# Patient Record
Sex: MALE | Race: BLACK OR AFRICAN AMERICAN | Employment: FULL TIME | URBAN - METROPOLITAN AREA
[De-identification: names, ages, dates, MRNs, and addresses within clinical notes are randomized per-mention and may not be internally consistent; named-entity substitution may affect disease eponyms.]

---

## 2017-11-30 ENCOUNTER — OFFICE VISIT (OUTPATIENT)
Dept: FAMILY MEDICINE CLINIC | Age: 57
End: 2017-11-30

## 2017-11-30 VITALS
BODY MASS INDEX: 36.91 KG/M2 | WEIGHT: 287.6 LBS | OXYGEN SATURATION: 98 % | HEIGHT: 74 IN | TEMPERATURE: 98.7 F | HEART RATE: 76 BPM | DIASTOLIC BLOOD PRESSURE: 76 MMHG | RESPIRATION RATE: 18 BRPM | SYSTOLIC BLOOD PRESSURE: 128 MMHG

## 2017-11-30 DIAGNOSIS — Z86.69 HISTORY OF GLAUCOMA: ICD-10-CM

## 2017-11-30 DIAGNOSIS — Z13.220 LIPID SCREENING: ICD-10-CM

## 2017-11-30 DIAGNOSIS — Z13.1 SCREENING FOR DIABETES MELLITUS: ICD-10-CM

## 2017-11-30 DIAGNOSIS — M17.12 ARTHRITIS OF LEFT KNEE: Primary | ICD-10-CM

## 2017-11-30 DIAGNOSIS — D57.3 SICKLE-CELL TRAIT (HCC): Chronic | ICD-10-CM

## 2017-11-30 LAB
A/G RATIO: 1.9 (ref 1.1–2.2)
ALBUMIN SERPL-MCNC: 4.8 G/DL (ref 3.4–5)
ALP BLD-CCNC: 59 U/L (ref 40–129)
ALT SERPL-CCNC: 17 U/L (ref 10–40)
ANION GAP SERPL CALCULATED.3IONS-SCNC: 14 MMOL/L (ref 3–16)
AST SERPL-CCNC: 20 U/L (ref 15–37)
BASOPHILS ABSOLUTE: 0.1 K/UL (ref 0–0.2)
BASOPHILS RELATIVE PERCENT: 0.9 %
BILIRUB SERPL-MCNC: 0.3 MG/DL (ref 0–1)
BUN BLDV-MCNC: 22 MG/DL (ref 7–20)
CALCIUM SERPL-MCNC: 9.5 MG/DL (ref 8.3–10.6)
CHLORIDE BLD-SCNC: 101 MMOL/L (ref 99–110)
CHOLESTEROL, TOTAL: 186 MG/DL (ref 0–199)
CO2: 25 MMOL/L (ref 21–32)
CREAT SERPL-MCNC: 1.4 MG/DL (ref 0.9–1.3)
EOSINOPHILS ABSOLUTE: 0.2 K/UL (ref 0–0.6)
EOSINOPHILS RELATIVE PERCENT: 2.7 %
GFR AFRICAN AMERICAN: >60
GFR NON-AFRICAN AMERICAN: 52
GLOBULIN: 2.5 G/DL
GLUCOSE BLD-MCNC: 118 MG/DL (ref 70–99)
HCT VFR BLD CALC: 40.6 % (ref 40.5–52.5)
HDLC SERPL-MCNC: 32 MG/DL (ref 40–60)
HEMOGLOBIN: 12.8 G/DL (ref 13.5–17.5)
LDL CHOLESTEROL CALCULATED: 121 MG/DL
LYMPHOCYTES ABSOLUTE: 1.8 K/UL (ref 1–5.1)
LYMPHOCYTES RELATIVE PERCENT: 31.2 %
MCH RBC QN AUTO: 22.3 PG (ref 26–34)
MCHC RBC AUTO-ENTMCNC: 31.4 G/DL (ref 31–36)
MCV RBC AUTO: 70.9 FL (ref 80–100)
MONOCYTES ABSOLUTE: 0.4 K/UL (ref 0–1.3)
MONOCYTES RELATIVE PERCENT: 7.6 %
NEUTROPHILS ABSOLUTE: 3.4 K/UL (ref 1.7–7.7)
NEUTROPHILS RELATIVE PERCENT: 57.6 %
PDW BLD-RTO: 15.2 % (ref 12.4–15.4)
PLATELET # BLD: 235 K/UL (ref 135–450)
PMV BLD AUTO: 8.8 FL (ref 5–10.5)
POTASSIUM SERPL-SCNC: 5 MMOL/L (ref 3.5–5.1)
RBC # BLD: 5.73 M/UL (ref 4.2–5.9)
SODIUM BLD-SCNC: 140 MMOL/L (ref 136–145)
TOTAL PROTEIN: 7.3 G/DL (ref 6.4–8.2)
TRIGL SERPL-MCNC: 163 MG/DL (ref 0–150)
VLDLC SERPL CALC-MCNC: 33 MG/DL
WBC # BLD: 5.9 K/UL (ref 4–11)

## 2017-11-30 PROCEDURE — 36415 COLL VENOUS BLD VENIPUNCTURE: CPT | Performed by: FAMILY MEDICINE

## 2017-11-30 PROCEDURE — 99203 OFFICE O/P NEW LOW 30 MIN: CPT | Performed by: FAMILY MEDICINE

## 2017-11-30 RX ORDER — IBUPROFEN 800 MG/1
800 TABLET ORAL EVERY 8 HOURS PRN
Qty: 60 TABLET | Refills: 2 | Status: SHIPPED | OUTPATIENT
Start: 2017-11-30 | End: 2019-08-09 | Stop reason: SDUPTHER

## 2017-11-30 ASSESSMENT — ENCOUNTER SYMPTOMS
BACK PAIN: 1
NAUSEA: 0
SHORTNESS OF BREATH: 0
DIARRHEA: 0
CONSTIPATION: 0
ABDOMINAL PAIN: 0
VOMITING: 0

## 2017-11-30 ASSESSMENT — PATIENT HEALTH QUESTIONNAIRE - PHQ9
2. FEELING DOWN, DEPRESSED OR HOPELESS: 0
SUM OF ALL RESPONSES TO PHQ9 QUESTIONS 1 & 2: 0
SUM OF ALL RESPONSES TO PHQ QUESTIONS 1-9: 0
1. LITTLE INTEREST OR PLEASURE IN DOING THINGS: 0

## 2017-11-30 NOTE — PATIENT INSTRUCTIONS
Patient Education        Deciding About Knee Replacement Surgery  What is knee replacement surgery? Knee replacement surgery replaces the worn ends of the thighbone (femur) and the lower leg bone (tibia) where they meet at the knee. Your doctor will take out the damaged bone and replace it with plastic and metal parts. These new parts may be attached to your bones with cement. You will need a lot of rehabilitation, or rehab, after surgery. This takes several weeks. But you should be able to start to walk, climb stairs, sit in and get up from chairs, and do other daily movements within a few days. What are macias points about this decision? · The decision you and your doctor make depends on how much pain and disability you have. It also depends on your age, health, and activity level. · Most people have this surgery only when they can no longer control knee pain with other treatments and when the pain disrupts their lives. · Rehab after this surgery means doing daily exercises for several weeks. · Most knee replacements last for at least 15 years. You may need to have the knee replaced again. Why might you choose to replace your knee? · You are not able to do most of your activities without pain. · You have very bad arthritis pain. Other treatments have not helped. · You do not have other health problems that would make surgery too risky. · You are not worried that you may need to have another knee replacement later in life. · You aren't worried about side effects. These may include infections, blood clots, and loss of range of motion. · You are willing to do weeks of rehab. · You want to have the surgery before you lose too much of your ability to be active. If you are not able to stay active, strong, and flexible before the surgery, it can be harder to return to your normal activities after the surgery. Why might you choose not to replace your knee?   · You can still do most of your

## 2017-11-30 NOTE — PROGRESS NOTES
[facility] Medicine  Clinic Note    Date: 11/30/2017                                               Subjective:     Chief Complaint   Patient presents with    New Patient     ESTABLISH CARE     Knee Pain     LEFT KNEE PAIN  SEVERAL YEARS HAS BEEN GOING ON . TAKES IBUPROFEN OCCASIONALLY THAT SOMETIMES HELPS. WENT TO AN ORTHOPEDIC DOCTOR SEVERAL YEARS AGO AND HAD CORTIZONE SHOTS THAT RELIEVED PAIN FOR AWHILE. HPI  Knee Pain: Patient complains of left knee pain. This is evaluated as a non acute injury. The pain began 10 years ago. The pain is located anterior, patellar. He describes the symptoms as aching. Symptoms improve with rest, medication: Aleve, Motrin used and beneficial, physical therapy, avoiding painful activities, cortisone injection. The symptoms are worse with activity, getting up from a chair. The knee has not given out or felt unstable. The patient cannot bend and straighten the knee fully. The patient is active in working. Treatment to date has been NSAID's, cortisone injection, therapy, with significant relief. Had cortisone 8-10 years ago by Dr. Marlo Bliss, told was \"bone on bone\". Arthroscopy for \"clean out\" about 10 years ago. Interested in hearing about options such as joint replacement.            Patient Active Problem List    Diagnosis Date Noted    Vitamin D deficiency 08/23/2010     Priority: Medium     Class: Chronic    Obesity 08/20/2010     Priority: Low     Class: Chronic    Erectile dysfunction      Priority: Low     Class: Chronic    Sickle[facility] trait ([facility])      Priority: Low     Class: Chronic    History of glaucoma 11/30/2017    Onychomycosis 07/09/2010    Sebaceous cyst      Past Medical History:   Diagnosis Date    Erectile dysfunction     Glaucoma 6/25/2013    Obesity 8/20/2010    Sebaceous cyst     Sickle-cell trait ([facility] [address]     Vitamin D deficiency 8/23/2010     Past Surgical History:   Procedure Laterality Date    COLONOSCOPY  September 24, 2010 Constitutional: He appears well-developed and well-nourished. HENT:   Head: Normocephalic and atraumatic. Cardiovascular: Normal rate, regular rhythm and normal heart sounds. Pulmonary/Chest: Effort normal and breath sounds normal.   Musculoskeletal:        Left knee: He exhibits decreased range of motion, swelling and effusion. He exhibits no ecchymosis, no erythema, no LCL laxity and no MCL laxity. Neurological: Gait abnormal.   Due to left knee OA   Psychiatric: He has a normal mood and affect. His behavior is normal.       Assessment/Plan:   Lauryn Obrien was seen today for new patient and knee pain. Diagnoses and all orders for this visit:    Arthritis of left knee  -     [facility]  -     ibuprofen (ADVIL;MOTRIN) 800 MG tablet; Take 1 tablet by mouth every 8 hours as needed for Pain (take with food)  -     Comprehensive Metabolic Panel; Future  -     Noreen Andrade MD (Hip and Knee)  -     Comprehensive Metabolic Panel    Screening for diabetes mellitus  -     Hemoglobin A1C; Future  -     Hemoglobin A1C    Lipid screening  -     Lipid Panel; Future  -     Lipid Panel    History of glaucoma    Sickle-cell trait (HCC)  -     CBC Auto Differential; Future  -     CBC Auto Differential      Return in about 1 year (around 11/30/2018).     [address],   11/30/2017  10:59 AM

## 2017-12-01 LAB
ESTIMATED AVERAGE GLUCOSE: 162.8 MG/DL
HBA1C MFR BLD: 7.3 %

## 2017-12-06 ENCOUNTER — OFFICE VISIT (OUTPATIENT)
Dept: FAMILY MEDICINE CLINIC | Age: 57
End: 2017-12-06

## 2017-12-06 ENCOUNTER — TELEPHONE (OUTPATIENT)
Dept: FAMILY MEDICINE CLINIC | Age: 57
End: 2017-12-06

## 2017-12-06 VITALS
DIASTOLIC BLOOD PRESSURE: 78 MMHG | WEIGHT: 288 LBS | HEART RATE: 74 BPM | TEMPERATURE: 98.1 F | RESPIRATION RATE: 16 BRPM | OXYGEN SATURATION: 98 % | BODY MASS INDEX: 36.96 KG/M2 | HEIGHT: 74 IN | SYSTOLIC BLOOD PRESSURE: 126 MMHG

## 2017-12-06 DIAGNOSIS — E11.9 TYPE 2 DIABETES MELLITUS WITHOUT COMPLICATION, WITHOUT LONG-TERM CURRENT USE OF INSULIN (HCC): Primary | ICD-10-CM

## 2017-12-06 DIAGNOSIS — Z11.59 ENCOUNTER FOR HEPATITIS C SCREENING TEST FOR LOW RISK PATIENT: ICD-10-CM

## 2017-12-06 DIAGNOSIS — Z11.4 SCREENING FOR HIV WITHOUT PRESENCE OF RISK FACTORS: ICD-10-CM

## 2017-12-06 DIAGNOSIS — D64.9 ANEMIA, UNSPECIFIED TYPE: ICD-10-CM

## 2017-12-06 DIAGNOSIS — E78.5 DYSLIPIDEMIA: ICD-10-CM

## 2017-12-06 DIAGNOSIS — Z12.11 SCREENING FOR COLON CANCER: ICD-10-CM

## 2017-12-06 LAB
CREATININE URINE: 8.7 MG/DL (ref 39–259)
MICROALBUMIN UR-MCNC: <1.2 MG/DL
MICROALBUMIN/CREAT UR-RTO: ABNORMAL MG/G (ref 0–30)

## 2017-12-06 PROCEDURE — 99214 OFFICE O/P EST MOD 30 MIN: CPT | Performed by: FAMILY MEDICINE

## 2017-12-06 RX ORDER — METFORMIN HYDROCHLORIDE 500 MG/1
500 TABLET, EXTENDED RELEASE ORAL
Qty: 30 TABLET | Refills: 2 | Status: SHIPPED | OUTPATIENT
Start: 2017-12-06 | End: 2018-04-17 | Stop reason: SDUPTHER

## 2017-12-06 RX ORDER — BLOOD-GLUCOSE METER
EACH MISCELLANEOUS
Qty: 1 KIT | Refills: 0 | Status: SHIPPED | OUTPATIENT
Start: 2017-12-06 | End: 2022-10-26

## 2017-12-06 ASSESSMENT — ENCOUNTER SYMPTOMS
COUGH: 0
ANAL BLEEDING: 0
SHORTNESS OF BREATH: 0
WHEEZING: 0
CHEST TIGHTNESS: 0
BLOOD IN STOOL: 0
ABDOMINAL PAIN: 0

## 2017-12-06 NOTE — PATIENT INSTRUCTIONS
Patient Education        Learning About Type 2 Diabetes  What is type 2 diabetes? Insulin is a hormone that helps your body use sugar from your food as energy. Type 2 diabetes happens when your body can't use insulin the right way. Over time, the pancreas can't make enough insulin. If you don't have enough insulin, too much sugar stays in your blood. If you are overweight, get little or no exercise, or have type 2 diabetes in your family, you are more likely to have problems with the way insulin works in your body.  Americans, Hispanics, Native Americans,  Americans, and Pacific Islanders have a higher risk for type 2 diabetes. Type 2 diabetes can be prevented or delayed with a healthy lifestyle, which includes staying at a healthy weight, making smart food choices, and getting regular exercise. What can you expect with type 2 diabetes? Matt Troy keep hearing about how important it is to keep your blood sugar within a target range. That's because over time, high blood sugar can lead to serious problems. It can:  · Harm your eyes, nerves, and kidneys. · Damage your blood vessels, leading to heart disease and stroke. · Reduce blood flow and cause nerve damage to parts of your body, especially your feet. This can cause slow healing and pain when you walk. · Make your immune system weak and less able to fight infections. When people hear the word \"diabetes,\" they often think of problems like these. But daily care and treatment can help prevent or delay these problems. The goal is to keep your blood sugar in a target range. That's the best way to reduce your chance of having more problems from diabetes. What are the symptoms? Some people who have type 2 diabetes may not have any symptoms early on. Many people with the disease don't even know they have it at first. But with time, diabetes starts to cause symptoms.  You experience most symptoms of type 2 diabetes when your blood sugar is either too list of the medicines you take. Where can you learn more? Go to https://[email or web address].[email or web address] org and sign in to your [employer] account. Enter I341 in the [employer] box to learn more about \"Learning About Type 2 Diabetes. \"     If you do not have an account, please click on the \"Sign Up Now\" link. Current as of: March 13, 2017  Content Version: 11.4  © 7822-1755 [employer]. Care instructions adapted under license by [facility] ([facility]). If you have questions about a medical condition or this instruction, always ask your healthcare professional. [place] [address] any warranty or liability for your use of this information. Patient Education        Learning About Metformin for Type 2 Diabetes  Introduction    Metformin (such as Glucophage) is a medicine used to treat type 2 diabetes. It helps keep blood sugar levels on target. You may have tried to eat a healthy diet, lose weight, and get more exercise to keep your blood sugar in your target range. If those things do not help, you may take a medicine called metformin. It helps your body use insulin. This can help you control your blood sugar. You might take it on its own or with other medicines. When taken on its own, metformin should not cause low blood sugar or weight gain. Example  · Metformin (Glucophage)  Possible side effects  Common side effects include:  · Short-term nausea. · Not feeling hungry. · Diarrhea. · Increased gas in your belly. · A metallic taste. You may have side effects or reactions not listed here. Check the information that comes with your medicine. What to know about taking this medicine  · Metformin does not usually cause low blood sugar. But you may get a low blood sugar when you take metformin and you exercise hard, drink alcohol, or you do not eat enough food. · Sometimes metformin is combined with other diabetes medicine. Some of these can cause low blood sugar.   · If you need a
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
## 2017-12-07 ENCOUNTER — OFFICE VISIT (OUTPATIENT)
Dept: ORTHOPEDIC SURGERY | Age: 57
End: 2017-12-07

## 2017-12-07 VITALS
DIASTOLIC BLOOD PRESSURE: 79 MMHG | WEIGHT: 288 LBS | HEIGHT: 74 IN | SYSTOLIC BLOOD PRESSURE: 127 MMHG | HEART RATE: 64 BPM | TEMPERATURE: 96.8 F | BODY MASS INDEX: 36.96 KG/M2

## 2017-12-07 DIAGNOSIS — M25.562 LEFT KNEE PAIN, UNSPECIFIED CHRONICITY: Primary | ICD-10-CM

## 2017-12-07 DIAGNOSIS — M17.12 ARTHRITIS OF LEFT KNEE: ICD-10-CM

## 2017-12-07 PROCEDURE — 20610 DRAIN/INJ JOINT/BURSA W/O US: CPT | Performed by: PHYSICIAN ASSISTANT

## 2017-12-07 PROCEDURE — 99202 OFFICE O/P NEW SF 15 MIN: CPT | Performed by: PHYSICIAN ASSISTANT

## 2017-12-08 ENCOUNTER — TELEPHONE (OUTPATIENT)
Dept: FAMILY MEDICINE CLINIC | Age: 57
End: 2017-12-08

## 2017-12-08 PROBLEM — M17.12 ARTHRITIS OF LEFT KNEE: Status: ACTIVE | Noted: 2017-12-08

## 2017-12-08 RX ORDER — LANCETS 30 GAUGE
EACH MISCELLANEOUS
Qty: 100 EACH | Refills: 5 | Status: SHIPPED | OUTPATIENT
Start: 2017-12-08 | End: 2021-11-01

## 2017-12-08 RX ORDER — GLUCOSAMINE HCL/CHONDROITIN SU 500-400 MG
CAPSULE ORAL
Qty: 100 STRIP | Refills: 5 | Status: SHIPPED | OUTPATIENT
Start: 2017-12-08 | End: 2021-11-01
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
## 2017-12-08 NOTE — TELEPHONE ENCOUNTER
Pt did get his glucose testing machine  His machine is [employer]      He needs the testing strips                         lancets  Or whatever he needs. Please call this in for pt--he is new to this.

## 2017-12-12 ENCOUNTER — HOSPITAL ENCOUNTER (OUTPATIENT)
Dept: OTHER | Age: 57
Discharge: HOME | End: 2017-12-31
Attending: FAMILY MEDICINE | Admitting: FAMILY MEDICINE

## 2017-12-12 NOTE — FLOWSHEET NOTE
patient on an HEP. Patient demonstrated exercises correctly. Handout with pictures and # of reps/sets was given. Exercises above. Manual Treatments:    Knee flexion and extension stretches    Modalities:   CP to L knee x 15 min     Timed Code Treatment Minutes:  40    Total Treatment Minutes:  70    Treatment/Activity Tolerance:  [x] Patient tolerated treatment well [] Patient limited by fatigue  [] Patient limited by pain  [] Patient limited by other medical complications  [] Other:     Prognosis: [x] Good [] Fair  [] Poor    Patient Requires Follow-up: [x] Yes  [] No    Plan:   [] Continue per plan of care [] Alter current plan (see comments)  [x] Plan of care initiated [] Hold pending MD visit [] Discharge    Plan for Next Session:    Manual: Left knee PROM and joint mobilizations. Left knee stretching.  STM  Left knee strengthening as tolerated  Modalities: CP, US, Estim as needed    Electronically signed by:  Cecilia Meeks, PT

## 2017-12-12 NOTE — PROGRESS NOTES
(LEFS) is an easily administered and scored functional outcome tool. It can be utilized for lower extremity conditions and is sensitive enough for a wide range of functional disability levels. It can and should be used on the initial visit and subsequently on a 2- 3 week basis to measure patient's progress. The tool has a sufficient measure of reliability, variability, and sensitivity to change for determining minimally clinically important score differences, on a test to re-test basis. Scoring:   LEFS Score = (Sum of Responses / 80) X 100    Error + / - 5 points, Minimum Level of Detectable Change (90% Confidence): 9 Points     DANIEL Izaguirre, EUGENIO Tyson, FELICITY Schaefer, & The [address] [place] [address], The Lower Extremity Functional Scale: Scale development, measurement properties, and clinical application, Physical Therapy, 1999, 79, [id number], with permission of the American Physical [employer] Association.       G-Code [place]:  LEFS Total Score Disability Index CMS Modifier   80 0% []CH   79-65 1-19% []CI   64-49 20-39% []CJ   48-33 40-59% []CK   32-17 60-79% [x]CL   16-1 80-99% []CM   0 100% []CN

## 2017-12-19 ENCOUNTER — HOSPITAL ENCOUNTER (OUTPATIENT)
Dept: PHYSICAL THERAPY | Age: 57
Discharge: HOME | End: 2017-12-20
Admitting: FAMILY MEDICINE

## 2018-01-01 ENCOUNTER — HOSPITAL ENCOUNTER (OUTPATIENT)
Dept: OTHER | Age: 58
Discharge: HOME | End: 2018-01-31
Attending: FAMILY MEDICINE | Admitting: FAMILY MEDICINE

## 2018-01-02 ENCOUNTER — HOSPITAL ENCOUNTER (OUTPATIENT)
Dept: PHYSICAL THERAPY | Age: 58
Discharge: HOME | End: 2018-01-03
Admitting: FAMILY MEDICINE

## 2018-01-02 NOTE — FLOWSHEET NOTE
Physical Therapy Daily Treatment Note  Date:  2018    Patient Name:  Beryle Nickels    :  1960  MRN: [id number]  Restrictions/Precautions:    Pertinent Medical History: DM-2. Glaucoma  Medical/Treatment Diagnosis Information:  · Diagnosis: OA of Left knee  · Treatment Diagnosis: Decreased L knee AROM and strength that is affecting ADLs  Insurance/Certification information:  PT Insurance Information: [employer]- 61 visits per year  Physician Information:  Referring Practitioner: Dr. Almaz Frank of care signed (Y/N):    Visit# / total visits:    Pain level: 5-7/10     G-Code (if applicable):      Date / Visit # G-Code Applied:  /  PT G-Codes  Functional Assessment Tool Used: LEFS  Score: 26  Functional Limitation: Mobility: Walking and moving around  Mobility: Walking and Moving Around Current Status (): At least 60 percent but less than 80 percent impaired, limited or restricted  Mobility: Walking and Moving Around Goal Status (): At least 1 percent but less than 20 percent impaired, limited or restricted    Progress Note: []  Yes  []  No  Next due by: Visit #10      History of Injury:  Pt states over thanksgiving his left knee locked up on him so he decided to go to the MD. States he has bone on bone in his knee. MD gave him a cortisone injection last week that helped some. States he has to do steps one at a time going up and down. States the cold weather causes his knee to lock up. Wants to improve his ROM. Pain is 5-7/10 in his knee. Subjective:     17: States his knee is stiff. Feeling about the same. HEP is going well  17: little sore after last visit but doing okay  17: Pt states he is feeling about the same    Objective:  Observation:   17: Pt ambulating with limp on left, increased knee flexion during stance phase.   Test measurements:    17: L knee AROM in supine: 7-104    Exercises:  Exercise/Equipment Resistance/Repetitions Other comments   Nu Step X 5 min, level 3 To improve knee flexion   Incline 3 x 30\"    HSS/HQS 3 x 30\"    Leg Press 105#  2 x 20 B  55#   3 x 20 L    LAQ 7.5#  2 x 20 L              HEP     HS and Gastroc Stretch      Heel Slide- Supine and sitting     SLR     Quad Set and Knee Extension stretch                 Other Therapeutic Activities:      Home Exercise Program:      Manual Treatments:    Knee Flexion and Extension stretches in supine. L HS stretch 3 x 40\"    Modalities:       Timed Code Treatment Minutes:  25    Total Treatment Minutes:  38    Treatment/Activity Tolerance:  [x] Patient tolerated treatment well [] Patient limited by fatigue  [] Patient limited by pain  [] Patient limited by other medical complications  [] Other:     Prognosis: [x] Good [] Fair  [] Poor    Patient Requires Follow-up: [x] Yes  [] No    Plan:   [x] Continue per plan of care [] Alter current plan (see comments)  [] Plan of care initiated [] Hold pending MD visit [] Discharge    Plan for Next Session:    Manual: Left knee PROM and joint mobilizations. Left knee stretching.  STM  Left knee strengthening as tolerated  Modalities: CP, US, Estim as needed    Electronically signed by:  Dawson Fallon, PT

## 2018-01-04 ENCOUNTER — HOSPITAL ENCOUNTER (OUTPATIENT)
Dept: PHYSICAL THERAPY | Age: 58
Discharge: HOME | End: 2018-01-05
Admitting: FAMILY MEDICINE

## 2018-01-10 ENCOUNTER — PAT TELEPHONE (OUTPATIENT)
Dept: PREADMISSION TESTING | Age: 58
End: 2018-01-10

## 2018-01-10 VITALS — WEIGHT: 290 LBS | HEIGHT: 74 IN | BODY MASS INDEX: 37.22 KG/M2

## 2018-01-11 ENCOUNTER — HOSPITAL ENCOUNTER (OUTPATIENT)
Dept: DIABETES SERVICES | Age: 58
Discharge: HOME | End: 2018-01-31
Admitting: FAMILY MEDICINE

## 2018-01-11 DIAGNOSIS — E11.9 TYPE 2 DIABETES MELLITUS WITHOUT COMPLICATIONS (HCC): ICD-10-CM

## 2018-01-11 ASSESSMENT — PATIENT HEALTH QUESTIONNAIRE - PHQ9
1. LITTLE INTEREST OR PLEASURE IN DOING THINGS: 1
SUM OF ALL RESPONSES TO PHQ9 QUESTIONS 1 & 2: 1
SUM OF ALL RESPONSES TO PHQ QUESTIONS 1-9: 1
2. FEELING DOWN, DEPRESSED OR HOPELESS: 0

## 2018-01-11 NOTE — LETTER
Diabetes Education  [facility] ([facility])    TO: Judge Mame DO    RE: Eduardocleveland REYNOSO.O.B.: 1960    An initial assessment to determine diabetes education needs was completed on 2018. Education included:      carb counting    label reading     plate guide for consistent carb intake    meter start      monitoring frequency      hypoglycemia prevention/treatment    activity/exercise      PHQ2 Depression Screen; patient scored 1. Recommend further evaluation if       score >3   other: An ongoing plan was created to include:    group classes                    follow up:        declined further education    Thank you for the opportunity to provide Diabetes Self Management Education to your patient.     Lena Krishna, HEATHERN, LD, CDE  Diabetes Educator

## 2018-01-11 NOTE — PROGRESS NOTES
Individual Comprehensive DSMT Assessment:  Health Literacy:   [x] adequate   [] limited  Pre-Test Score: 5/8    Sleep Screen: Negative for symptoms of Obstructive Sleep Apnea  PHQ9: 1      Initial instruction included:  [x] carb counting  [] activity/exercise  [x] label reading  [x] monitoring   [] medications  [] hypoglycemia prevention/treatment  [] insulin management  [] other:    Education and Support Plan: Patient declined to schedule at this time, but stated intention to schedule in the near future.       Referring Provider: Annabella Orozco DO

## 2018-01-15 ENCOUNTER — SURG/PROC ORDERS (OUTPATIENT)
Dept: ANESTHESIOLOGY | Age: 58
End: 2018-01-15

## 2018-01-15 RX ORDER — SODIUM CHLORIDE 0.9 % (FLUSH) 0.9 %
10 SYRINGE (ML) INJECTION PRN
Status: CANCELLED | OUTPATIENT
Start: 2018-01-15

## 2018-01-15 RX ORDER — SODIUM CHLORIDE 9 MG/ML
INJECTION, SOLUTION INTRAVENOUS CONTINUOUS
Status: CANCELLED | OUTPATIENT
Start: 2018-01-15

## 2018-01-15 RX ORDER — SODIUM CHLORIDE 0.9 % (FLUSH) 0.9 %
10 SYRINGE (ML) INJECTION EVERY 12 HOURS SCHEDULED
Status: CANCELLED | OUTPATIENT
Start: 2018-01-15

## 2018-01-17 ENCOUNTER — HOSPITAL ENCOUNTER (OUTPATIENT)
Dept: ENDOSCOPY | Age: 58
Discharge: HOME | End: 2018-01-17
Attending: INTERNAL MEDICINE | Admitting: INTERNAL MEDICINE

## 2018-01-17 VITALS
SYSTOLIC BLOOD PRESSURE: 133 MMHG | HEART RATE: 58 BPM | DIASTOLIC BLOOD PRESSURE: 77 MMHG | OXYGEN SATURATION: 100 % | HEIGHT: 74 IN | RESPIRATION RATE: 16 BRPM | BODY MASS INDEX: 35.23 KG/M2 | TEMPERATURE: 97.8 F | WEIGHT: 274.5 LBS

## 2018-01-17 LAB
GLUCOSE BLD-MCNC: 125 MG/DL (ref 70–99)
PERFORMED ON: ABNORMAL

## 2018-01-17 RX ORDER — SODIUM CHLORIDE 0.9 % (FLUSH) 0.9 %
10 SYRINGE (ML) INJECTION EVERY 12 HOURS SCHEDULED
Status: DISCONTINUED | OUTPATIENT
Start: 2018-01-17 | End: 2018-01-18 | Stop reason: HOSPADM

## 2018-01-17 RX ORDER — LABETALOL HYDROCHLORIDE 5 MG/ML
5 INJECTION, SOLUTION INTRAVENOUS EVERY 10 MIN PRN
Status: DISCONTINUED | OUTPATIENT
Start: 2018-01-17 | End: 2018-01-18 | Stop reason: HOSPADM

## 2018-01-17 RX ORDER — PROMETHAZINE HYDROCHLORIDE 25 MG/ML
6.25 INJECTION, SOLUTION INTRAMUSCULAR; INTRAVENOUS
Status: ACTIVE | OUTPATIENT
Start: 2018-01-17 | End: 2018-01-17

## 2018-01-17 RX ORDER — SODIUM CHLORIDE 0.9 % (FLUSH) 0.9 %
10 SYRINGE (ML) INJECTION PRN
Status: DISCONTINUED | OUTPATIENT
Start: 2018-01-17 | End: 2018-01-18 | Stop reason: HOSPADM

## 2018-01-17 RX ORDER — ONDANSETRON 2 MG/ML
4 INJECTION INTRAMUSCULAR; INTRAVENOUS
Status: ACTIVE | OUTPATIENT
Start: 2018-01-17 | End: 2018-01-17

## 2018-01-17 RX ORDER — SODIUM CHLORIDE 9 MG/ML
INJECTION, SOLUTION INTRAVENOUS CONTINUOUS
Status: DISCONTINUED | OUTPATIENT
Start: 2018-01-17 | End: 2018-01-18 | Stop reason: HOSPADM

## 2018-01-17 RX ADMIN — SODIUM CHLORIDE: 9 INJECTION, SOLUTION INTRAVENOUS at 11:19

## 2018-01-17 ASSESSMENT — PAIN SCALES - GENERAL
PAINLEVEL_OUTOF10: 0

## 2018-01-17 ASSESSMENT — PAIN - FUNCTIONAL ASSESSMENT: PAIN_FUNCTIONAL_ASSESSMENT: 0-10

## 2018-01-17 NOTE — H&P
Pre-operative History and Physical    Patient: Jordan Gross  : 1960  Acct#:     Intended Procedure: Colonoscopy    HISTORY OF PRESENT ILLNESS:  The patient is a 61 y.o. male  who presents for/due to Screening colonoscopy- Average risk       Past Medical History:        Diagnosis Date    Diabetes mellitus ([facility]     Erectile dysfunction     Glaucoma 2013    Obesity 2010    Sebaceous cyst     [facility]     Vitamin D deficiency 2010     Past Surgical History:        Procedure Laterality Date    COLONOSCOPY  2010    Dr. Mikala Mcmullen ARTHROSCOPY Left January 3, 2002    Dr. Jennyfer Banegas    age 8 - first grade    WISDOM TOOTH EXTRACTION       Medications Prior to Admission:   Current Outpatient Prescriptions on File Prior to Encounter   Medication Sig Dispense Refill    Lancets MISC Test once daily 100 each 5    Glucose Blood (BLOOD GLUCOSE TEST STRIPS) STRP Test once daily 100 strip 5    metFORMIN (GLUCOPHAGE-XR) 500 MG extended release tablet Take 1 tablet by mouth daily (with breakfast) 30 tablet 2    Blood Glucose Monitoring Suppl (EASY STEP GLUCOSE MONITOR) w/Device KIT Test once daily 1 kit 0    ibuprofen (ADVIL;MOTRIN) 800 MG tablet Take 1 tablet by mouth every 8 hours as needed for Pain (take with food) 60 tablet 2     No current facility-administered medications on file prior to encounter. Allergies:  Review of patient's allergies indicates no known allergies. Social History:   TOBACCO:   reports that he has never smoked. He has never used smokeless tobacco.  ETOH:   reports that he does not drink alcohol. DRUGS:   reports that he does not use drugs. PHYSICAL EXAM:      Vital Signs: There were no vitals taken for this visit. Airway: No stridor or wheezing noted.   Good air movement  Pulmonary: without wheezes. Clear to auscultation  Cardiac:regular rate and rhythm without loud murmurs  Abdomen:soft, nontender,  Bowel sounds present    Pre-Procedure Assessment / Plan:  1) Screening colonoscopy- Average risk     ASA Grade:  ASA 2 - Patient with mild systemic disease with no functional limitations  Mallampati Classification:  Class II    Level of Sedation Plan:Deep sedation    Post Procedure plan: Return to same level of care    I assessed the patient and find that the patient is in satisfactory condition to proceed with the planned procedure and sedation plan. I have explained the risk, benefits, and alternatives to the procedure; the patient understands and agrees to proceed.        Jose Faustin, DO  1/17/2018

## 2018-01-17 NOTE — ANESTHESIA PRE-OP
[facility] Department of Anesthesiology  Pre-Anesthesia Evaluation/Consultation       Name:  Mercedes Perez  : 1960  Age:  61 y.o.                                            MRN:  [id number]  Date: 2018           Procedure (Scheduled):  colonoscopy  Surgeon:  Dr. Maral Dangelo     No Known Allergies  Patient Active Problem List   Diagnosis    Sebaceous cyst    Erectile dysfunction    S[facility] trait [facility]    Onychomycosis    Obesity    Vitamin D deficiency    Left knee pain    History of glaucoma    Type 2 diabetes mellitus without complication, without long-term current use of insulin ([address]    Arthritis of left knee     Past Medical History:   Diagnosis Date    Diabetes mellitus ([facility]     Erectile dysfunction     Glaucoma 2013    Obesity 2010    Sebaceous cyst     Sickle-cell trait ([facility]     Vitamin D deficiency 2010     Past Surgical History:   Procedure Laterality Date    COLONOSCOPY  2010    Dr. Ed Wan Res ARTHROSCOPY Left January 3, 2002    Dr. 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

## 2018-01-23 ENCOUNTER — OFFICE VISIT (OUTPATIENT)
Dept: FAMILY MEDICINE CLINIC | Age: 58
End: 2018-01-23

## 2018-01-23 VITALS
HEIGHT: 74 IN | RESPIRATION RATE: 16 BRPM | TEMPERATURE: 97.5 F | BODY MASS INDEX: 36.04 KG/M2 | WEIGHT: 280.8 LBS | DIASTOLIC BLOOD PRESSURE: 78 MMHG | SYSTOLIC BLOOD PRESSURE: 128 MMHG | OXYGEN SATURATION: 98 % | HEART RATE: 56 BPM

## 2018-01-23 DIAGNOSIS — Z11.59 ENCOUNTER FOR HEPATITIS C SCREENING TEST FOR LOW RISK PATIENT: ICD-10-CM

## 2018-01-23 DIAGNOSIS — E55.9 VITAMIN D DEFICIENCY: ICD-10-CM

## 2018-01-23 DIAGNOSIS — Z11.4 SCREENING FOR HIV WITHOUT PRESENCE OF RISK FACTORS: ICD-10-CM

## 2018-01-23 DIAGNOSIS — E11.9 TYPE 2 DIABETES MELLITUS WITHOUT COMPLICATION, WITHOUT LONG-TERM CURRENT USE OF INSULIN (HCC): Primary | ICD-10-CM

## 2018-01-23 DIAGNOSIS — D64.9 ANEMIA, UNSPECIFIED TYPE: ICD-10-CM

## 2018-01-23 PROCEDURE — 99214 OFFICE O/P EST MOD 30 MIN: CPT | Performed by: FAMILY MEDICINE

## 2018-01-23 ASSESSMENT — ENCOUNTER SYMPTOMS
ABDOMINAL PAIN: 0
DIARRHEA: 0
NAUSEA: 0
CONSTIPATION: 0
VOMITING: 0
SHORTNESS OF BREATH: 0

## 2018-01-23 NOTE — PATIENT INSTRUCTIONS
Patient Education        Learning About Vitamin D  Why is it important to get enough vitamin D? Your body needs vitamin D to absorb calcium. Calcium keeps your bones and muscles, including your heart, healthy and strong. If your muscles don't get enough calcium, they can cramp, hurt, or feel weak. You may have long-term (chronic) muscle aches and pains. If you don't get enough vitamin D throughout life, you have an increased chance of having thin and brittle bones (osteoporosis) in your later years. Children who don't get enough vitamin D may not grow as much as others their age. They also have a chance of getting a rare disease called rickets. It causes weak bones. Vitamin D and calcium are added to many foods. And your body uses sunshine to make its own vitamin D. How much vitamin D do you need? The [employer] recommends that people ages 3 through 79 get 600 IU (international units) every day. Adults 71 and older need 800 IU every day. Blood tests for vitamin D can check your vitamin D level. But there is no standard normal range used by all laboratories. The [employer] recommends a blood level of 20 ng/mL of vitamin D for healthy bones. And most people in the [place] and [facility] ([facility]) meet this goal.  How can you get more vitamin D? Foods that contain vitamin D include:  · Palmer, tuna, and mackerel. These are some of the best foods to eat when you need to get more vitamin D.  · Cheese, egg yolks, and beef liver. These foods have vitamin D in small amounts. · Milk, soy drinks, orange juice, yogurt, margarine, and some kinds of cereal have vitamin D added to them. Some people don't make vitamin D as well as others. They may have to take extra care in getting enough vitamin D. Things that reduce how much vitamin D your body makes include:  · Dark skin, such as many  Americans have. · Age, especially if you are older than 72.   · Digestive problems, such as Crohn's or celiac

## 2018-01-23 NOTE — PROGRESS NOTES
Position: Sitting, Cuff Size: Medium Adult)   Pulse 56   Temp 97.5 °F (36.4 °C) (Oral)   Resp 16   Ht 6' 2\" (1.88 m) Comment: no shoes  Wt 280 lb 12.8 oz (127.4 kg) Comment: NO SHOES  SpO2 98%   BMI 36.05 kg/m²     Physical Exam   Constitutional: He is oriented to person, place, and time. He appears well-developed and well-nourished. He is cooperative. HENT:   Head: Normocephalic and atraumatic. Eyes: Conjunctivae, EOM and lids are normal.   Cardiovascular: Normal rate and regular rhythm. Pulmonary/Chest: Effort normal and breath sounds normal.   Abdominal: Soft. Normal appearance. He exhibits no distension. Neurological: He is alert and oriented to person, place, and time. Skin: Skin is warm and dry. Psychiatric: He has a normal mood and affect. His speech is normal and behavior is normal.       Assessment/Plan:   Milena Hdz was seen today for follow-up. Diagnoses and all orders for this visit:    Type 2 diabetes mellitus without complication, without long-term current use of insulin ([facility])  -     Glucose 2 hour pp; Future  -     Basic Metabolic Panel; Future    Anemia, unspecified type  -     CBC Auto Differential; Future  -     Iron and TIBC; Future  -     Ferritin; Future    Vitamin D deficiency  -     Vitamin D 25 Hydroxy; Future      Recheck anemia and add iron level  Check vit D, too soon to check A1c again  May need to add statin and ACE in future  Return in about 6 weeks (around 3/6/2018) for Routine follow up of medical issues, Medication review/refills.     [id number] [address]  1/23/2018  11:27 AM

## 2018-01-25 ENCOUNTER — OFFICE VISIT (OUTPATIENT)
Dept: ORTHOPEDIC SURGERY | Age: 58
End: 2018-01-25

## 2018-01-25 VITALS — WEIGHT: 280 LBS | TEMPERATURE: 97.5 F | RESPIRATION RATE: 16 BRPM | BODY MASS INDEX: 35.94 KG/M2 | HEIGHT: 74 IN

## 2018-01-25 DIAGNOSIS — M17.12 ARTHRITIS OF LEFT KNEE: Primary | ICD-10-CM

## 2018-01-25 PROCEDURE — 99212 OFFICE O/P EST SF 10 MIN: CPT | Performed by: PHYSICIAN ASSISTANT
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
## 2018-01-26 DIAGNOSIS — E11.9 TYPE 2 DIABETES MELLITUS WITHOUT COMPLICATION, WITHOUT LONG-TERM CURRENT USE OF INSULIN (HCC): ICD-10-CM

## 2018-01-26 DIAGNOSIS — D64.9 ANEMIA, UNSPECIFIED TYPE: ICD-10-CM

## 2018-01-26 DIAGNOSIS — Z11.59 ENCOUNTER FOR HEPATITIS C SCREENING TEST FOR LOW RISK PATIENT: ICD-10-CM

## 2018-01-26 DIAGNOSIS — E55.9 VITAMIN D DEFICIENCY: ICD-10-CM

## 2018-01-26 DIAGNOSIS — Z11.4 SCREENING FOR HIV WITHOUT PRESENCE OF RISK FACTORS: ICD-10-CM

## 2018-01-26 LAB
ANION GAP SERPL CALCULATED.3IONS-SCNC: 10 MMOL/L (ref 3–16)
BASOPHILS ABSOLUTE: 0 K/UL (ref 0–0.2)
BASOPHILS RELATIVE PERCENT: 0.6 %
BUN BLDV-MCNC: 14 MG/DL (ref 7–20)
CALCIUM SERPL-MCNC: 9.2 MG/DL (ref 8.3–10.6)
CHLORIDE BLD-SCNC: 101 MMOL/L (ref 99–110)
CO2: 29 MMOL/L (ref 21–32)
CREAT SERPL-MCNC: 1.3 MG/DL (ref 0.9–1.3)
EOSINOPHILS ABSOLUTE: 0.1 K/UL (ref 0–0.6)
EOSINOPHILS RELATIVE PERCENT: 2.9 %
FERRITIN: 114.7 NG/ML (ref 30–400)
GFR AFRICAN AMERICAN: >60
GFR NON-AFRICAN AMERICAN: 57
GLUCOSE BLD-MCNC: 117 MG/DL (ref 70–99)
GLUCOSE FASTING: 120 MG/DL (ref 0–99)
GLUCOSE TOLERANCE TEST 2 HOUR: 190 MG/DL
HCT VFR BLD CALC: 40.9 % (ref 40.5–52.5)
HEMOGLOBIN: 13 G/DL (ref 13.5–17.5)
HEPATITIS C ANTIBODY INTERPRETATION: NORMAL
IRON SATURATION: 35 % (ref 20–50)
IRON: 97 UG/DL (ref 59–158)
LYMPHOCYTES ABSOLUTE: 1.5 K/UL (ref 1–5.1)
LYMPHOCYTES RELATIVE PERCENT: 31.1 %
MCH RBC QN AUTO: 22.3 PG (ref 26–34)
MCHC RBC AUTO-ENTMCNC: 31.8 G/DL (ref 31–36)
MCV RBC AUTO: 70 FL (ref 80–100)
MONOCYTES ABSOLUTE: 0.4 K/UL (ref 0–1.3)
MONOCYTES RELATIVE PERCENT: 8.3 %
NEUTROPHILS ABSOLUTE: 2.8 K/UL (ref 1.7–7.7)
NEUTROPHILS RELATIVE PERCENT: 57.1 %
PDW BLD-RTO: 15.9 % (ref 12.4–15.4)
PLATELET # BLD: 221 K/UL (ref 135–450)
PMV BLD AUTO: 9.1 FL (ref 5–10.5)
POTASSIUM SERPL-SCNC: 4.4 MMOL/L (ref 3.5–5.1)
RBC # BLD: 5.84 M/UL (ref 4.2–5.9)
SODIUM BLD-SCNC: 140 MMOL/L (ref 136–145)
TOTAL IRON BINDING CAPACITY: 277 UG/DL (ref 260–445)
VITAMIN B-12: 230 PG/ML (ref 211–911)
WBC # BLD: 4.9 K/UL (ref 4–11)

## 2018-01-29 LAB
HIV AG/AB: NORMAL
HIV ANTIGEN: NORMAL
HIV-1 ANTIBODY: NORMAL
HIV-2 AB: NORMAL

## 2018-02-01 ENCOUNTER — HOSPITAL ENCOUNTER (OUTPATIENT)
Dept: OTHER | Age: 58
Discharge: HOME | End: 2018-02-28
Attending: FAMILY MEDICINE | Admitting: FAMILY MEDICINE

## 2018-04-17 DIAGNOSIS — E11.9 TYPE 2 DIABETES MELLITUS WITHOUT COMPLICATION, WITHOUT LONG-TERM CURRENT USE OF INSULIN (HCC): ICD-10-CM

## 2018-04-17 RX ORDER — METFORMIN HYDROCHLORIDE 500 MG/1
TABLET, EXTENDED RELEASE ORAL
Qty: 30 TABLET | Refills: 0 | Status: SHIPPED | OUTPATIENT
Start: 2018-04-17 | End: 2018-06-11 | Stop reason: SDUPTHER

## 2018-06-11 DIAGNOSIS — E11.9 TYPE 2 DIABETES MELLITUS WITHOUT COMPLICATION, WITHOUT LONG-TERM CURRENT USE OF INSULIN (HCC): ICD-10-CM

## 2018-06-11 RX ORDER — METFORMIN HYDROCHLORIDE 500 MG/1
TABLET, EXTENDED RELEASE ORAL
Qty: 30 TABLET | Refills: 0 | Status: SHIPPED | OUTPATIENT
Start: 2018-06-11 | End: 2019-07-30 | Stop reason: SDUPTHER

## 2018-12-18 ENCOUNTER — TELEPHONE (OUTPATIENT)
Dept: FAMILY MEDICINE CLINIC | Age: 58
End: 2018-12-18

## 2019-07-30 ENCOUNTER — OFFICE VISIT (OUTPATIENT)
Dept: FAMILY MEDICINE CLINIC | Age: 59
End: 2019-07-30
Payer: COMMERCIAL

## 2019-07-30 VITALS
BODY MASS INDEX: 35.94 KG/M2 | OXYGEN SATURATION: 97 % | SYSTOLIC BLOOD PRESSURE: 128 MMHG | HEART RATE: 58 BPM | DIASTOLIC BLOOD PRESSURE: 70 MMHG | HEIGHT: 74 IN | RESPIRATION RATE: 14 BRPM | WEIGHT: 280 LBS

## 2019-07-30 DIAGNOSIS — R25.2 LEG CRAMPS: ICD-10-CM

## 2019-07-30 DIAGNOSIS — Z23 NEED FOR PNEUMOCOCCAL VACCINATION: ICD-10-CM

## 2019-07-30 DIAGNOSIS — Z00.00 ANNUAL PHYSICAL EXAM: Primary | ICD-10-CM

## 2019-07-30 DIAGNOSIS — E11.9 TYPE 2 DIABETES MELLITUS WITHOUT COMPLICATION, WITHOUT LONG-TERM CURRENT USE OF INSULIN (HCC): ICD-10-CM

## 2019-07-30 LAB
CREATININE URINE POCT: 200
HBA1C MFR BLD: 6.6 %
MICROALBUMIN/CREAT 24H UR: 30 MG/G{CREAT}
MICROALBUMIN/CREAT UR-RTO: <30

## 2019-07-30 PROCEDURE — 99396 PREV VISIT EST AGE 40-64: CPT | Performed by: FAMILY MEDICINE

## 2019-07-30 PROCEDURE — 90732 PPSV23 VACC 2 YRS+ SUBQ/IM: CPT | Performed by: FAMILY MEDICINE

## 2019-07-30 PROCEDURE — 83036 HEMOGLOBIN GLYCOSYLATED A1C: CPT | Performed by: FAMILY MEDICINE

## 2019-07-30 PROCEDURE — 82044 UR ALBUMIN SEMIQUANTITATIVE: CPT | Performed by: FAMILY MEDICINE

## 2019-07-30 PROCEDURE — 90471 IMMUNIZATION ADMIN: CPT | Performed by: FAMILY MEDICINE

## 2019-07-30 RX ORDER — METFORMIN HYDROCHLORIDE 500 MG/1
TABLET, EXTENDED RELEASE ORAL
Qty: 90 TABLET | Refills: 1 | Status: SHIPPED | OUTPATIENT
Start: 2019-07-30 | End: 2020-09-15 | Stop reason: SDUPTHER

## 2019-07-30 ASSESSMENT — ENCOUNTER SYMPTOMS
COUGH: 0
DIARRHEA: 0
WHEEZING: 0
EYE PAIN: 0
COLOR CHANGE: 0
CHOKING: 0
SHORTNESS OF BREATH: 0
PHOTOPHOBIA: 0
VOMITING: 0
TROUBLE SWALLOWING: 0
SORE THROAT: 0
ABDOMINAL PAIN: 0

## 2019-07-30 ASSESSMENT — PATIENT HEALTH QUESTIONNAIRE - PHQ9
SUM OF ALL RESPONSES TO PHQ9 QUESTIONS 1 & 2: 0
SUM OF ALL RESPONSES TO PHQ QUESTIONS 1-9: 0
SUM OF ALL RESPONSES TO PHQ QUESTIONS 1-9: 0
2. FEELING DOWN, DEPRESSED OR HOPELESS: 0
1. LITTLE INTEREST OR PLEASURE IN DOING THINGS: 0

## 2019-07-30 NOTE — PROGRESS NOTES
and trouble swallowing. Eyes: Positive for visual disturbance. Negative for photophobia and pain. Respiratory: Negative for cough, choking, shortness of breath and wheezing. Cardiovascular: Negative for chest pain, palpitations and leg swelling. Gastrointestinal: Negative for abdominal pain, diarrhea and vomiting. Endocrine: Positive for polyuria. Negative for polydipsia. Genitourinary: Positive for frequency. Negative for hematuria. Musculoskeletal: Positive for arthralgias. Negative for gait problem and joint swelling. Skin: Negative for color change, rash and wound. Neurological: Negative for tremors, syncope and numbness. Objective:  /70 (Site: Right Upper Arm, Position: Sitting, Cuff Size: Large Adult)   Pulse 58   Resp 14   Ht 6' 2\" (1.88 m)   Wt 280 lb (127 kg)   SpO2 97%   BMI 35.95 kg/m²     Physical Exam   Constitutional: He is oriented to person, place, and time. He appears well-developed and well-nourished. He is cooperative. HENT:   Head: Normocephalic and atraumatic. Right Ear: Hearing, tympanic membrane, external ear and ear canal normal.   Left Ear: Hearing, tympanic membrane, external ear and ear canal normal.   Mouth/Throat: Uvula is midline, oropharynx is clear and moist and mucous membranes are normal. No oropharyngeal exudate. Eyes: Pupils are equal, round, and reactive to light. Conjunctivae, EOM and lids are normal.   Neck: Neck supple. No tracheal deviation present. Cardiovascular: Normal rate and regular rhythm. Pulmonary/Chest: Effort normal and breath sounds normal.   Abdominal: Soft. Normal appearance. He exhibits no distension. There is no tenderness. Musculoskeletal: He exhibits no edema or deformity. Lymphadenopathy:     He has no cervical adenopathy. Neurological: He is alert and oriented to person, place, and time. Skin: Skin is warm and dry. Psychiatric: He has a normal mood and affect.  His speech is normal and behavior is

## 2019-07-31 DIAGNOSIS — R25.2 LEG CRAMPS: ICD-10-CM

## 2019-07-31 DIAGNOSIS — E11.9 TYPE 2 DIABETES MELLITUS WITHOUT COMPLICATION, WITHOUT LONG-TERM CURRENT USE OF INSULIN (HCC): ICD-10-CM

## 2019-07-31 LAB
A/G RATIO: 2.2 (ref 1.1–2.2)
ALBUMIN SERPL-MCNC: 4.7 G/DL (ref 3.4–5)
ALP BLD-CCNC: 50 U/L (ref 40–129)
ALT SERPL-CCNC: 13 U/L (ref 10–40)
ANION GAP SERPL CALCULATED.3IONS-SCNC: 11 MMOL/L (ref 3–16)
AST SERPL-CCNC: 19 U/L (ref 15–37)
BASOPHILS ABSOLUTE: 0 K/UL (ref 0–0.2)
BASOPHILS RELATIVE PERCENT: 0.8 %
BILIRUB SERPL-MCNC: 0.5 MG/DL (ref 0–1)
BUN BLDV-MCNC: 21 MG/DL (ref 7–20)
CALCIUM SERPL-MCNC: 9.7 MG/DL (ref 8.3–10.6)
CHLORIDE BLD-SCNC: 104 MMOL/L (ref 99–110)
CHOLESTEROL, TOTAL: 162 MG/DL (ref 0–199)
CO2: 26 MMOL/L (ref 21–32)
CREAT SERPL-MCNC: 1.4 MG/DL (ref 0.9–1.3)
EOSINOPHILS ABSOLUTE: 0.1 K/UL (ref 0–0.6)
EOSINOPHILS RELATIVE PERCENT: 2.2 %
FERRITIN: 99.8 NG/ML (ref 30–400)
FOLATE: 10.45 NG/ML (ref 4.78–24.2)
GFR AFRICAN AMERICAN: >60
GFR NON-AFRICAN AMERICAN: 52
GLOBULIN: 2.1 G/DL
GLUCOSE BLD-MCNC: 118 MG/DL (ref 70–99)
HBV SURFACE AB TITR SER: <3.5 MIU/ML
HCT VFR BLD CALC: 38.2 % (ref 40.5–52.5)
HDLC SERPL-MCNC: 36 MG/DL (ref 40–60)
HEMOGLOBIN: 12.2 G/DL (ref 13.5–17.5)
IRON SATURATION: 27 % (ref 20–50)
IRON: 75 UG/DL (ref 59–158)
LDL CHOLESTEROL CALCULATED: 103 MG/DL
LYMPHOCYTES ABSOLUTE: 1.5 K/UL (ref 1–5.1)
LYMPHOCYTES RELATIVE PERCENT: 31.9 %
MAGNESIUM: 2.2 MG/DL (ref 1.8–2.4)
MCH RBC QN AUTO: 22.5 PG (ref 26–34)
MCHC RBC AUTO-ENTMCNC: 32 G/DL (ref 31–36)
MCV RBC AUTO: 70.3 FL (ref 80–100)
MONOCYTES ABSOLUTE: 0.4 K/UL (ref 0–1.3)
MONOCYTES RELATIVE PERCENT: 8.3 %
NEUTROPHILS ABSOLUTE: 2.6 K/UL (ref 1.7–7.7)
NEUTROPHILS RELATIVE PERCENT: 56.8 %
PDW BLD-RTO: 15.8 % (ref 12.4–15.4)
PLATELET # BLD: 226 K/UL (ref 135–450)
PMV BLD AUTO: 9 FL (ref 5–10.5)
POTASSIUM SERPL-SCNC: 4.7 MMOL/L (ref 3.5–5.1)
RBC # BLD: 5.44 M/UL (ref 4.2–5.9)
SODIUM BLD-SCNC: 141 MMOL/L (ref 136–145)
TOTAL IRON BINDING CAPACITY: 273 UG/DL (ref 260–445)
TOTAL PROTEIN: 6.8 G/DL (ref 6.4–8.2)
TRIGL SERPL-MCNC: 114 MG/DL (ref 0–150)
TSH REFLEX: 0.89 UIU/ML (ref 0.27–4.2)
VITAMIN B-12: 305 PG/ML (ref 211–911)
VITAMIN D 25-HYDROXY: 19.9 NG/ML
VLDLC SERPL CALC-MCNC: 23 MG/DL
WBC # BLD: 4.6 K/UL (ref 4–11)

## 2019-08-09 DIAGNOSIS — M17.12 ARTHRITIS OF LEFT KNEE: ICD-10-CM

## 2019-08-12 NOTE — TELEPHONE ENCOUNTER
You will have to get refills from your primary care provider.   We are considered an urgent care and do not provide refills

## 2019-08-19 RX ORDER — IBUPROFEN 800 MG/1
TABLET ORAL
Qty: 30 TABLET | Refills: 0 | Status: SHIPPED | OUTPATIENT
Start: 2019-08-19 | End: 2020-08-24 | Stop reason: ALTCHOICE

## 2019-08-20 ENCOUNTER — OFFICE VISIT (OUTPATIENT)
Dept: FAMILY MEDICINE CLINIC | Age: 59
End: 2019-08-20
Payer: COMMERCIAL

## 2019-08-20 VITALS
SYSTOLIC BLOOD PRESSURE: 133 MMHG | HEART RATE: 76 BPM | BODY MASS INDEX: 35.34 KG/M2 | HEIGHT: 74 IN | TEMPERATURE: 98 F | WEIGHT: 275.4 LBS | DIASTOLIC BLOOD PRESSURE: 76 MMHG | OXYGEN SATURATION: 99 % | RESPIRATION RATE: 16 BRPM

## 2019-08-20 DIAGNOSIS — R05.9 COUGH: Primary | ICD-10-CM

## 2019-08-20 PROCEDURE — 99213 OFFICE O/P EST LOW 20 MIN: CPT | Performed by: FAMILY MEDICINE

## 2019-08-20 RX ORDER — AZITHROMYCIN 250 MG/1
250 TABLET, FILM COATED ORAL SEE ADMIN INSTRUCTIONS
Qty: 6 TABLET | Refills: 0 | Status: SHIPPED | OUTPATIENT
Start: 2019-08-20 | End: 2019-08-25

## 2019-08-20 RX ORDER — ALBUTEROL SULFATE 90 UG/1
2 AEROSOL, METERED RESPIRATORY (INHALATION) 4 TIMES DAILY PRN
Qty: 1 INHALER | Refills: 0 | Status: SHIPPED | OUTPATIENT
Start: 2019-08-20 | End: 2020-02-06 | Stop reason: ALTCHOICE

## 2019-08-20 RX ORDER — M-VIT,TX,IRON,MINS/CALC/FOLIC 27MG-0.4MG
1 TABLET ORAL DAILY
COMMUNITY

## 2020-02-06 ENCOUNTER — OFFICE VISIT (OUTPATIENT)
Dept: FAMILY MEDICINE CLINIC | Age: 60
End: 2020-02-06
Payer: COMMERCIAL

## 2020-02-06 ENCOUNTER — TELEPHONE (OUTPATIENT)
Dept: FAMILY MEDICINE CLINIC | Age: 60
End: 2020-02-06

## 2020-02-06 VITALS
BODY MASS INDEX: 33.8 KG/M2 | SYSTOLIC BLOOD PRESSURE: 126 MMHG | RESPIRATION RATE: 15 BRPM | HEART RATE: 82 BPM | HEIGHT: 74 IN | TEMPERATURE: 98.4 F | DIASTOLIC BLOOD PRESSURE: 82 MMHG | WEIGHT: 263.4 LBS | OXYGEN SATURATION: 98 %

## 2020-02-06 PROCEDURE — 99213 OFFICE O/P EST LOW 20 MIN: CPT | Performed by: REGISTERED NURSE

## 2020-02-06 RX ORDER — MULTIVIT-MIN/IRON/FOLIC ACID/K 18-600-40
2000 CAPSULE ORAL DAILY
COMMUNITY

## 2020-02-06 RX ORDER — NAPROXEN 500 MG/1
500 TABLET ORAL DAILY
COMMUNITY
Start: 2020-01-24 | End: 2020-09-15 | Stop reason: SDUPTHER

## 2020-02-06 RX ORDER — SULFAMETHOXAZOLE AND TRIMETHOPRIM 800; 160 MG/1; MG/1
1 TABLET ORAL 2 TIMES DAILY
Qty: 20 TABLET | Refills: 0 | Status: SHIPPED | OUTPATIENT
Start: 2020-02-06 | End: 2020-02-16

## 2020-02-06 RX ORDER — CHLORHEXIDINE GLUCONATE 4 G/100ML
SOLUTION TOPICAL
Qty: 2 BOTTLE | Refills: 0 | Status: SHIPPED | OUTPATIENT
Start: 2020-02-06 | End: 2020-02-20

## 2020-02-06 ASSESSMENT — ENCOUNTER SYMPTOMS
CHEST TIGHTNESS: 0
SHORTNESS OF BREATH: 0
COUGH: 0
VOMITING: 0
NAUSEA: 0
WHEEZING: 0
ABDOMINAL PAIN: 0
DIARRHEA: 0
CONSTIPATION: 0

## 2020-02-06 NOTE — TELEPHONE ENCOUNTER
patient has a bump on his leg, started out very small not is about 1-1.5 inch diameter and is very hard and inflamed and painful. Does have a center to it.

## 2020-02-06 NOTE — PROGRESS NOTES
07/31/2019 75  59 - 158 ug/dL Final    TIBC 07/31/2019 273  260 - 445 ug/dL Final    Iron Saturation 07/31/2019 27  20 - 50 % Final    Hep B S Ab 07/31/2019 <3.50  mIU/mL Final    Comment: <8.5 = Negative  >=8.5 and <11.5 = Indeterminate  >=11.5 = Positive (Immune)      Vit D, 25-Hydroxy 07/31/2019 19.9* >=30 ng/mL Final    Comment: <=20 ng/mL. ........... Canda Nay Deficient  21-29 ng/mL. ......... Canda Nay Insufficient  >=30 ng/mL. ........ Canda Nay Sufficient      Vitamin B-12 07/31/2019 305  211 - 911 pg/mL Final    Folate 07/31/2019 10.45  4.78 - 24.20 ng/mL Final    Comment: Effective 11-15-16 10:00am EST  Please note reference ranges have  changed for Folate.       Magnesium 07/31/2019 2.20  1.80 - 2.40 mg/dL Final    Ferritin 07/31/2019 99.8  30.0 - 400.0 ng/mL Final    TSH 07/31/2019 0.89  0.27 - 4.20 uIU/mL Final    WBC 07/31/2019 4.6  4.0 - 11.0 K/uL Final    RBC 07/31/2019 5.44  4.20 - 5.90 M/uL Final    Hemoglobin 07/31/2019 12.2* 13.5 - 17.5 g/dL Final    Hematocrit 07/31/2019 38.2* 40.5 - 52.5 % Final    MCV 07/31/2019 70.3* 80.0 - 100.0 fL Final    MCH 07/31/2019 22.5* 26.0 - 34.0 pg Final    MCHC 07/31/2019 32.0  31.0 - 36.0 g/dL Final    RDW 07/31/2019 15.8* 12.4 - 15.4 % Final    Platelets 25/89/2505 226  135 - 450 K/uL Final    MPV 07/31/2019 9.0  5.0 - 10.5 fL Final    Neutrophils % 07/31/2019 56.8  % Final    Lymphocytes % 07/31/2019 31.9  % Final    Monocytes % 07/31/2019 8.3  % Final    Eosinophils % 07/31/2019 2.2  % Final    Basophils % 07/31/2019 0.8  % Final    Neutrophils Absolute 07/31/2019 2.6  1.7 - 7.7 K/uL Final    Lymphocytes Absolute 07/31/2019 1.5  1.0 - 5.1 K/uL Final    Monocytes Absolute 07/31/2019 0.4  0.0 - 1.3 K/uL Final    Eosinophils Absolute 07/31/2019 0.1  0.0 - 0.6 K/uL Final    Basophils Absolute 07/31/2019 0.0  0.0 - 0.2 K/uL Final    Sodium 07/31/2019 141  136 - 145 mmol/L Final    Potassium 07/31/2019 4.7  3.5 - 5.1 mmol/L Final    Chloride 07/31/2019 104  99 - 110 ear canal and external ear normal.      Nose: Nose normal.      Mouth/Throat:      Mouth: Mucous membranes are moist.      Pharynx: Oropharynx is clear. Uvula midline. Eyes:      Extraocular Movements: Extraocular movements intact. Conjunctiva/sclera: Conjunctivae normal.      Pupils: Pupils are equal, round, and reactive to light. Neck:      Musculoskeletal: Normal range of motion and neck supple. Thyroid: No thyromegaly. Cardiovascular:      Rate and Rhythm: Normal rate and regular rhythm. Pulses: Normal pulses. Heart sounds: Normal heart sounds. Pulmonary:      Effort: Pulmonary effort is normal.      Breath sounds: Normal breath sounds. Abdominal:      General: Bowel sounds are normal.      Palpations: Abdomen is soft. Musculoskeletal: Normal range of motion. Lymphadenopathy:      Cervical: No cervical adenopathy. Skin:     General: Skin is warm and dry. Capillary Refill: Capillary refill takes less than 2 seconds. Findings: Abscess present. Comments: Red, hard, minimal drainage. Neurological:      General: No focal deficit present. Mental Status: He is alert and oriented to person, place, and time. Psychiatric:         Mood and Affect: Mood normal.         Behavior: Behavior normal.         Thought Content: Thought content normal.         Judgment: Judgment normal.         Assessment/Plan     1. Cellulitis of left upper extremity  Abscess is not raised or shows fluctuation. Treat with oral antibiotics. Given Hibiclens. Educated on wound care. - sulfamethoxazole-trimethoprim (BACTRIM DS) 800-160 MG per tablet; Take 1 tablet by mouth 2 times daily for 10 days  Dispense: 20 tablet; Refill: 0  - chlorhexidine (HIBICLENS) 4 % external liquid; Apply topically daily as needed. Dispense: 2 Bottle; Refill: 0      No orders of the defined types were placed in this encounter. Return if symptoms worsen or fail to improve.     Rory Esposito NP 2/6/2020  1:28 PM

## 2020-02-12 ENCOUNTER — OFFICE VISIT (OUTPATIENT)
Dept: FAMILY MEDICINE CLINIC | Age: 60
End: 2020-02-12
Payer: COMMERCIAL

## 2020-02-12 VITALS
SYSTOLIC BLOOD PRESSURE: 126 MMHG | RESPIRATION RATE: 16 BRPM | BODY MASS INDEX: 36.57 KG/M2 | WEIGHT: 285 LBS | HEART RATE: 74 BPM | HEIGHT: 74 IN | DIASTOLIC BLOOD PRESSURE: 84 MMHG | TEMPERATURE: 98.6 F

## 2020-02-12 PROCEDURE — 99213 OFFICE O/P EST LOW 20 MIN: CPT | Performed by: NURSE PRACTITIONER

## 2020-02-12 RX ORDER — AZITHROMYCIN 250 MG/1
250 TABLET, FILM COATED ORAL SEE ADMIN INSTRUCTIONS
Qty: 6 TABLET | Refills: 0 | Status: SHIPPED | OUTPATIENT
Start: 2020-02-12 | End: 2020-02-12 | Stop reason: CLARIF

## 2020-02-12 RX ORDER — PREDNISONE 10 MG/1
TABLET ORAL
Qty: 12 TABLET | Refills: 0 | Status: SHIPPED | OUTPATIENT
Start: 2020-02-12 | End: 2020-02-12 | Stop reason: CLARIF

## 2020-02-12 NOTE — PROGRESS NOTES
07/31/2019 31.9  % Final    Monocytes % 07/31/2019 8.3  % Final    Eosinophils % 07/31/2019 2.2  % Final    Basophils % 07/31/2019 0.8  % Final    Neutrophils Absolute 07/31/2019 2.6  1.7 - 7.7 K/uL Final    Lymphocytes Absolute 07/31/2019 1.5  1.0 - 5.1 K/uL Final    Monocytes Absolute 07/31/2019 0.4  0.0 - 1.3 K/uL Final    Eosinophils Absolute 07/31/2019 0.1  0.0 - 0.6 K/uL Final    Basophils Absolute 07/31/2019 0.0  0.0 - 0.2 K/uL Final    Sodium 07/31/2019 141  136 - 145 mmol/L Final    Potassium 07/31/2019 4.7  3.5 - 5.1 mmol/L Final    Chloride 07/31/2019 104  99 - 110 mmol/L Final    CO2 07/31/2019 26  21 - 32 mmol/L Final    Anion Gap 07/31/2019 11  3 - 16 Final    Glucose 07/31/2019 118* 70 - 99 mg/dL Final    BUN 07/31/2019 21* 7 - 20 mg/dL Final    CREATININE 07/31/2019 1.4* 0.9 - 1.3 mg/dL Final    GFR Non- 07/31/2019 52* >60 Final    Comment: >60 mL/min/1.73m2 EGFR, calc. for ages 25 and older using the  MDRD formula (not corrected for weight), is valid for stable  renal function.  GFR  07/31/2019 >60  >60 Final    Comment: Chronic Kidney Disease: less than 60 ml/min/1.73 sq.m. Kidney Failure: less than 15 ml/min/1.73 sq.m. Results valid for patients 18 years and older.       Calcium 07/31/2019 9.7  8.3 - 10.6 mg/dL Final    Total Protein 07/31/2019 6.8  6.4 - 8.2 g/dL Final    Alb 07/31/2019 4.7  3.4 - 5.0 g/dL Final    Albumin/Globulin Ratio 07/31/2019 2.2  1.1 - 2.2 Final    Total Bilirubin 07/31/2019 0.5  0.0 - 1.0 mg/dL Final    Alkaline Phosphatase 07/31/2019 50  40 - 129 U/L Final    ALT 07/31/2019 13  10 - 40 U/L Final    AST 07/31/2019 19  15 - 37 U/L Final    Globulin 07/31/2019 2.1  g/dL Final    Cholesterol, Total 07/31/2019 162  0 - 199 mg/dL Final    Triglycerides 07/31/2019 114  0 - 150 mg/dL Final    HDL 07/31/2019 36* 40 - 60 mg/dL Final    LDL Calculated 07/31/2019 103* <100 mg/dL Final    VLDL Cholesterol

## 2020-02-12 NOTE — PATIENT INSTRUCTIONS
Patient Education        Skin Abscess: Care Instructions  Your Care Instructions    A skin abscess is a bacterial infection that forms a pocket of pus. A boil is a kind of skin abscess. The doctor may have cut an opening in the abscess so that the pus can drain out. You may have gauze in the cut so that the abscess will stay open and keep draining. You may need antibiotics. You will need to follow up with your doctor to make sure the infection has gone away. The doctor has checked you carefully, but problems can develop later. If you notice any problems or new symptoms, get medical treatment right away. Follow-up care is a key part of your treatment and safety. Be sure to make and go to all appointments, and call your doctor if you are having problems. It's also a good idea to know your test results and keep a list of the medicines you take. How can you care for yourself at home? · Apply warm and dry compresses, a heating pad set on low, or a hot water bottle 3 or 4 times a day for pain. Keep a cloth between the heat source and your skin. · If your doctor prescribed antibiotics, take them as directed. Do not stop taking them just because you feel better. You need to take the full course of antibiotics. · Take pain medicines exactly as directed. ? If the doctor gave you a prescription medicine for pain, take it as prescribed. ? If you are not taking a prescription pain medicine, ask your doctor if you can take an over-the-counter medicine. · Keep your bandage clean and dry. Change the bandage whenever it gets wet or dirty, or at least one time a day. · If the abscess was packed with gauze:  ? Keep follow-up appointments to have the gauze changed or removed. If the doctor instructed you to remove the gauze, follow the instructions you were given for how to remove it. ? After the gauze is removed, soak the area in warm water for 15 to 20 minutes 2 times a day, until the wound closes.   When should you call for help? Call your doctor now or seek immediate medical care if:    · You have signs of worsening infection, such as:  ? Increased pain, swelling, warmth, or redness. ? Red streaks leading from the infected skin. ? Pus draining from the wound. ? A fever.    Watch closely for changes in your health, and be sure to contact your doctor if:    · You do not get better as expected. Where can you learn more? Go to https://[employer] org and sign in to your [employer] account. Enter H631 in the Trendrating box to learn more about \"Skin Abscess: Care Instructions. \"     If you do not have an account, please click on the \"Sign Up Now\" link. Current as of: April 1, 2019  Content Version: 12.3  © 7101-5561 Healthwise, Incorporated. Care instructions adapted under license by [facility] ([facility]). If you have questions about a medical condition or this instruction, always ask your healthcare professional. [place] [address] any warranty or liability for your use of this information.

## 2020-02-14 ENCOUNTER — TELEPHONE (OUTPATIENT)
Dept: FAMILY MEDICINE CLINIC | Age: 60
End: 2020-02-14

## 2020-02-14 LAB
GRAM STAIN RESULT: ABNORMAL
ORGANISM: ABNORMAL
WOUND/ABSCESS: ABNORMAL

## 2020-02-15 ENCOUNTER — HOSPITAL ENCOUNTER (EMERGENCY)
Age: 60
Discharge: HOME | End: 2020-02-15
Attending: EMERGENCY MEDICINE
Payer: COMMERCIAL

## 2020-02-15 VITALS
HEIGHT: 74 IN | BODY MASS INDEX: 37.01 KG/M2 | OXYGEN SATURATION: 99 % | RESPIRATION RATE: 16 BRPM | TEMPERATURE: 97.7 F | SYSTOLIC BLOOD PRESSURE: 142 MMHG | WEIGHT: 288.36 LBS | DIASTOLIC BLOOD PRESSURE: 78 MMHG | HEART RATE: 62 BPM

## 2020-02-15 LAB
ANION GAP SERPL CALCULATED.3IONS-SCNC: 12 MMOL/L (ref 3–16)
BASOPHILS ABSOLUTE: 0 K/UL (ref 0–0.2)
BASOPHILS RELATIVE PERCENT: 1 %
BUN BLDV-MCNC: 18 MG/DL (ref 7–20)
CALCIUM SERPL-MCNC: 9.2 MG/DL (ref 8.3–10.6)
CHLORIDE BLD-SCNC: 104 MMOL/L (ref 99–110)
CO2: 25 MMOL/L (ref 21–32)
CREAT SERPL-MCNC: 1.6 MG/DL (ref 0.8–1.3)
EOSINOPHILS ABSOLUTE: 0.1 K/UL (ref 0–0.6)
EOSINOPHILS RELATIVE PERCENT: 2.9 %
GFR AFRICAN AMERICAN: 54
GFR NON-AFRICAN AMERICAN: 44
GLUCOSE BLD-MCNC: 127 MG/DL (ref 70–99)
HCT VFR BLD CALC: 38.7 % (ref 40.5–52.5)
HEMOGLOBIN: 12.2 G/DL (ref 13.5–17.5)
LYMPHOCYTES ABSOLUTE: 1.5 K/UL (ref 1–5.1)
LYMPHOCYTES RELATIVE PERCENT: 33.9 %
MCH RBC QN AUTO: 22.2 PG (ref 26–34)
MCHC RBC AUTO-ENTMCNC: 31.6 G/DL (ref 31–36)
MCV RBC AUTO: 70.2 FL (ref 80–100)
MONOCYTES ABSOLUTE: 0.5 K/UL (ref 0–1.3)
MONOCYTES RELATIVE PERCENT: 11.2 %
NEUTROPHILS ABSOLUTE: 2.2 K/UL (ref 1.7–7.7)
NEUTROPHILS RELATIVE PERCENT: 51 %
PDW BLD-RTO: 14.7 % (ref 12.4–15.4)
PLATELET # BLD: 244 K/UL (ref 135–450)
PMV BLD AUTO: 7.9 FL (ref 5–10.5)
POTASSIUM REFLEX MAGNESIUM: 4.6 MMOL/L (ref 3.5–5.1)
RBC # BLD: 5.51 M/UL (ref 4.2–5.9)
SODIUM BLD-SCNC: 141 MMOL/L (ref 136–145)
WBC # BLD: 4.3 K/UL (ref 4–11)

## 2020-02-15 PROCEDURE — 80048 BASIC METABOLIC PNL TOTAL CA: CPT

## 2020-02-15 PROCEDURE — 85025 COMPLETE CBC W/AUTO DIFF WBC: CPT

## 2020-02-15 PROCEDURE — 90471 IMMUNIZATION ADMIN: CPT | Performed by: EMERGENCY MEDICINE

## 2020-02-15 PROCEDURE — 2500000003 HC RX 250 WO HCPCS: Performed by: EMERGENCY MEDICINE

## 2020-02-15 PROCEDURE — 6360000002 HC RX W HCPCS: Performed by: EMERGENCY MEDICINE

## 2020-02-15 PROCEDURE — 90715 TDAP VACCINE 7 YRS/> IM: CPT | Performed by: EMERGENCY MEDICINE

## 2020-02-15 PROCEDURE — 96365 THER/PROPH/DIAG IV INF INIT: CPT

## 2020-02-15 PROCEDURE — 99282 EMERGENCY DEPT VISIT SF MDM: CPT

## 2020-02-15 RX ORDER — CLINDAMYCIN PHOSPHATE 600 MG/50ML
600 INJECTION INTRAVENOUS ONCE
Status: COMPLETED | OUTPATIENT
Start: 2020-02-15 | End: 2020-02-15

## 2020-02-15 RX ORDER — CLINDAMYCIN HYDROCHLORIDE 300 MG/1
300 CAPSULE ORAL 3 TIMES DAILY
Qty: 30 CAPSULE | Refills: 0 | Status: SHIPPED | OUTPATIENT
Start: 2020-02-15 | End: 2020-02-25

## 2020-02-15 RX ADMIN — CLINDAMYCIN PHOSPHATE 600 MG: 600 INJECTION, SOLUTION INTRAVENOUS at 07:56

## 2020-02-15 RX ADMIN — TETANUS TOXOID, REDUCED DIPHTHERIA TOXOID AND ACELLULAR PERTUSSIS VACCINE, ADSORBED 0.5 ML: 5; 2.5; 8; 8; 2.5 SUSPENSION INTRAMUSCULAR at 09:18

## 2020-02-15 ASSESSMENT — PAIN SCALES - GENERAL: PAINLEVEL_OUTOF10: 2

## 2020-02-15 ASSESSMENT — PAIN DESCRIPTION - FREQUENCY: FREQUENCY: INTERMITTENT

## 2020-02-15 ASSESSMENT — PAIN DESCRIPTION - PROGRESSION: CLINICAL_PROGRESSION: GRADUALLY WORSENING

## 2020-02-15 ASSESSMENT — PAIN DESCRIPTION - LOCATION: LOCATION: ABDOMEN

## 2020-02-15 ASSESSMENT — PAIN DESCRIPTION - DESCRIPTORS: DESCRIPTORS: DISCOMFORT

## 2020-02-15 ASSESSMENT — PAIN DESCRIPTION - ORIENTATION: ORIENTATION: LEFT

## 2020-02-15 ASSESSMENT — PAIN - FUNCTIONAL ASSESSMENT: PAIN_FUNCTIONAL_ASSESSMENT: ACTIVITIES ARE NOT PREVENTED

## 2020-02-15 NOTE — ED PROVIDER NOTES
smoked. He has never used smokeless tobacco. He reports that he does not drink alcohol or use drugs. SURGICAL HISTORY  Past Surgical History:   Procedure Laterality Date    COLONOSCOPY  September 24, 2010    Dr. Tristan Ragland    COLONOSCOPY  01/17/2018    normal, recheck 10 years   Rashid Pressley     KNEE ARTHROSCOPY Left January 3, 2002    Dr. Cele Deutsch    age 8 - first grade       CURRENT MEDICATIONS  Current Outpatient Rx   Medication Sig Dispense Refill    clindamycin (CLEOCIN) 300 MG capsule Take 1 capsule by mouth 3 times daily for 10 days 30 capsule 0    naproxen (NAPROSYN) 500 MG tablet Take 500 mg by mouth daily      Cholecalciferol (VITAMIN D) 50 MCG (2000 UT) CAPS capsule Take 2,000 Units by mouth daily      sulfamethoxazole-trimethoprim (BACTRIM DS) 800-160 MG per tablet Take 1 tablet by mouth 2 times daily for 10 days 20 tablet 0    chlorhexidine (HIBICLENS) 4 % external liquid Apply topically daily as needed. 2 Bottle 0    Multiple Vitamins-Minerals (THERAPEUTIC MULTIVITAMIN-MINERALS) tablet Take 1 tablet by mouth daily      ibuprofen (ADVIL;MOTRIN) 800 MG tablet TAKE 1 TABLET BY MOUTH THREE TIMES DAILY AS NEEDED FOR PAIN.  30 tablet 0    metFORMIN (GLUCOPHAGE-XR) 500 MG extended release tablet TAKE 1 TABLET BY MOUTH DAILY WITH BREAKFAST 90 tablet 1    Lancets MISC Test once daily 100 each 5    Glucose Blood (BLOOD GLUCOSE TEST STRIPS) STRP Test once daily 100 strip 5    Blood Glucose Monitoring Suppl (EASY STEP GLUCOSE MONITOR) w/Device KIT Test once daily 1 kit 0       ALLERGIES  No Known Allergies    PHYSICAL EXAM  VITAL SIGNS: /74   Pulse 59   Temp 97.5 °F (36.4 °C) (Oral)   Resp 16   Ht 6' 2\" (1.88 m)   Wt 288 lb 5.8 oz (130.8 kg)   SpO2 98%   BMI 37.02 kg/m²   Constitutional: Well-developed, well-nourished, appears normal, nontoxic, activity: Resting company on the cart, no obvious pain, does not appear ill  Skin: Warm, Dry, No erythema, No rash. no Lacerations, no Abrasions. Back: No tenderness, Full range of motion, No scoliosis. Extremities:  neurovascular intact, no deformity, there is 4 cm of induration on the medial mid left thigh with an open ulceration that has a small amount of drainage (less than 1 cc) with mild swelling, mild erythema, there appears to be some scar tissue forming at this time due to the age, no lacerations noted, no amputations, no cyanosis, no streaking, there is mild inguinal adenopathy, no mottling, no ecchymosis, mild tenderness of medial mid left thigh in the abscess area, capillary refill less than 2 seconds. Musculoskeletal: Good range of motion in all other major joints except those described above. Neurologic: Alert & oriented x 3, Normal motor function, Normal sensory function, No focal deficits noted. Psychiatric: Anxious, Judgment normal, Mood normal, no confusion. LABORATORY  Labs Reviewed   CBC WITH AUTO DIFFERENTIAL - Abnormal; Notable for the following components:       Result Value    Hemoglobin 12.2 (*)     Hematocrit 38.7 (*)     MCV 70.2 (*)     MCH 22.2 (*)     All other components within normal limits    Narrative:     Performed at:  [facility]  [address] [address] [address]   Phone [phone]   BASIC METABOLIC PANEL W/ REFLEX TO MG FOR LOW K - Abnormal; Notable for the following components:    Glucose 127 (*)     CREATININE 1.6 (*)     GFR Non- 44 (*)     GFR  54 (*)     All other components within normal limits    Narrative:     Performed at:  [facility]  [address] [place], [place] [address]   Phone [phone]         RADIOLOGY/PROCEDURES  I personally reviewed the images for this case.     No orders to display       [address] MAKING  Pertinent Labs reviewed. (See chart for details)    Vitals:    02/15/20 0709   BP: 139/74   Pulse: 59   Resp: 16   Temp: 97.5 °F (36.4 °C)   TempSrc: Oral   SpO2: 98%   Weight: 288 lb 5.8 oz (130.8 kg)   Height: 6' 2\" (1.88 m)       Medications   Tetanus-Diphth-Acell Pertussis (BOOSTRIX) injection 0.5 mL (has no administration in time range)   clindamycin (CLEOCIN) 600 mg in dextrose 5 % 50 mL IVPB (600 mg Intravenous New Bag 2/15/20 0756)       New Prescriptions    CLINDAMYCIN (CLEOCIN) 300 MG CAPSULE    Take 1 capsule by mouth 3 times daily for 10 days       Patient remained stable in the ED. ultrasound of his thigh did not reveal any abscess. There were inflammatory changes noted. Therefore, I do not feel any further I&D is necessary at this time. I reviewed his culture results from the I&D of his thigh. It was susceptible to clindamycin and Bactrim. Therefore, he was instructed to stop the Bactrim and start taking clindamycin because he feels this is not getting any better. He was instructed to take Tylenol and Advil for pain follow-up with his doctor in 5 days for reevaluation. He was instructed to return to the emerge department if he got worse. The patient's blood pressure was found to be elevated according to CMS/Medicare and the Affordable Care Act/ObamaCare criteria. Elevated blood pressure could occur because of pain or anxiety or other reasons and does not mean that they need to have their blood pressure treated or medications otherwise adjusted. However, this could also be a sign that they will need to have their blood pressure treated or medications changed. The patient was instructed to follow up closely with their personal physician to have their blood pressure rechecked. The patient was instructed to take a list of recent blood pressure readings to their next visit with their personal physician.     See discharge instructions for specific medications, discharge information, and

## 2020-02-21 ENCOUNTER — OFFICE VISIT (OUTPATIENT)
Dept: FAMILY MEDICINE CLINIC | Age: 60
End: 2020-02-21
Payer: COMMERCIAL

## 2020-02-21 VITALS
HEART RATE: 74 BPM | BODY MASS INDEX: 36.7 KG/M2 | RESPIRATION RATE: 16 BRPM | WEIGHT: 286 LBS | DIASTOLIC BLOOD PRESSURE: 60 MMHG | SYSTOLIC BLOOD PRESSURE: 100 MMHG | HEIGHT: 74 IN | OXYGEN SATURATION: 96 %

## 2020-02-21 PROCEDURE — 99213 OFFICE O/P EST LOW 20 MIN: CPT | Performed by: NURSE PRACTITIONER

## 2020-02-21 RX ORDER — CLINDAMYCIN HYDROCHLORIDE 300 MG/1
300 CAPSULE ORAL 3 TIMES DAILY
Qty: 30 CAPSULE | Refills: 0 | Status: SHIPPED | OUTPATIENT
Start: 2020-02-21 | End: 2020-02-28

## 2020-02-21 NOTE — PROGRESS NOTES
mass index is 36.72 kg/m². Wt Readings from Last 3 Encounters:   02/21/20 286 lb (129.7 kg)   02/15/20 288 lb 5.8 oz (130.8 kg)   02/12/20 285 lb (129.3 kg)     BP Readings from Last 3 Encounters:   02/21/20 100/60   02/15/20 (!) 142/78   02/12/20 126/84        No results found for this visit on 02/21/20. Assessment     Diagnosis Orders   1. Abscess of left thigh           Plan    Solving left thigh abscess  Given 7 additional days of clindamycin to take if the loculation area is not improved completely when the next 3 days of treatment are complete. May fill that additional 7 days to fully allow the infection to clear  Use warm compresses to allow drainage. Return if symptoms worsen or fail to improve. There are no Patient Instructions on file for this visit.

## 2020-08-24 ENCOUNTER — OFFICE VISIT (OUTPATIENT)
Dept: FAMILY MEDICINE CLINIC | Age: 60
End: 2020-08-24
Payer: COMMERCIAL

## 2020-08-24 VITALS
OXYGEN SATURATION: 99 % | HEART RATE: 84 BPM | DIASTOLIC BLOOD PRESSURE: 80 MMHG | SYSTOLIC BLOOD PRESSURE: 138 MMHG | WEIGHT: 275 LBS | HEIGHT: 74 IN | TEMPERATURE: 98.2 F | BODY MASS INDEX: 35.29 KG/M2

## 2020-08-24 LAB — HBA1C MFR BLD: 6.7 %

## 2020-08-24 PROCEDURE — 20610 DRAIN/INJ JOINT/BURSA W/O US: CPT | Performed by: NURSE PRACTITIONER

## 2020-08-24 PROCEDURE — 83036 HEMOGLOBIN GLYCOSYLATED A1C: CPT | Performed by: NURSE PRACTITIONER

## 2020-08-24 RX ORDER — METHYLPREDNISOLONE ACETATE 80 MG/ML
80 INJECTION, SUSPENSION INTRA-ARTICULAR; INTRALESIONAL; INTRAMUSCULAR; SOFT TISSUE ONCE
Status: COMPLETED | OUTPATIENT
Start: 2020-08-24 | End: 2020-08-24

## 2020-08-24 RX ADMIN — METHYLPREDNISOLONE ACETATE 80 MG: 80 INJECTION, SUSPENSION INTRA-ARTICULAR; INTRALESIONAL; INTRAMUSCULAR; SOFT TISSUE at 16:15

## 2020-08-24 NOTE — PATIENT INSTRUCTIONS
Patient Education        Knee Arthritis: Care Instructions  Your Care Instructions     Knee arthritis is a breakdown of the cartilage that cushions your knee joint. When the cartilage wears down, your bones rub against each other. This causes pain and stiffness. Knee arthritis tends to get worse with time. Treatment for knee arthritis involves reducing pain, making the leg muscles stronger, and staying at a healthy body weight. The treatment usually does not improve the health of the cartilage, but it can reduce pain and improve how well your knee works. You can take simple measures to protect your knee joints, ease your pain, and help you stay active. Follow-up care is a key part of your treatment and safety. Be sure to make and go to all appointments, and call your doctor if you are having problems. It's also a good idea to know your test results and keep a list of the medicines you take. How can you care for yourself at home? · Know that knee arthritis will cause more pain on some days than on others. · Stay at a healthy weight. Lose weight if you are overweight. When you stand up, the pressure on your knees from every pound of body weight is multiplied four times. So if you lose 10 pounds, you will reduce the pressure on your knees by 40 pounds. · Talk to your doctor or physical therapist about exercises that will help ease joint pain. ? Stretch to help prevent stiffness and to prevent injury before you exercise. You may enjoy gentle forms of yoga to help keep your knee joints and muscles flexible. ? Walk instead of jog.  ? Ride a bike. This makes your thigh muscles stronger and takes pressure off your knee. ? Wear well-fitting and comfortable shoes. ? Exercise in chest-deep water. This can help you exercise longer with less pain. ? Avoid exercises that include squatting or kneeling. They can put a lot of strain on your knees.   ? Talk to your doctor to make sure that the exercise you do is not now or seek immediate medical care if:  · You have sudden swelling, warmth, or pain in your knee. · You have knee pain and a fever or rash. · You have such bad pain that you cannot use your knee. Watch closely for changes in your health, and be sure to contact your doctor if you have any problems. Where can you learn more? Go to https://Upstart Labspepiceweb.ZeaChem. org and sign in to your Spotster account. Enter H750 in the [employer] box to learn more about \"Knee Arthritis: Care Instructions. \"     If you do not have an account, please click on the \"Sign Up Now\" link. Current as of: December 9, 2019               Content Version: 12.5  © 7444-3219 Healthwise, [employer]. Care instructions adapted under license by [facility] ([facility]). If you have questions about a medical condition or this instruction, always ask your healthcare professional. [address] any warranty or liability for your use of this information. Patient Education        Learning About Meal Planning for Diabetes  Why plan your meals? Meal planning can be a key part of managing diabetes. Planning meals and snacks with the right balance of carbohydrate, protein, and fat can help you keep your blood sugar at the target level you set with your doctor. You don't have to eat special foods. You can eat what your family eats, including sweets once in a while. But you do have to pay attention to how often you eat and how much you eat of certain foods. You may want to work with a dietitian or a certified diabetes educator. He or she can give you tips and meal ideas and can answer your questions about meal planning. This health professional can also help you reach a healthy weight if that is one of your goals. What plan is right for you? Your dietitian or diabetes educator may suggest that you start with the plate format or carbohydrate counting.   The plate format  The plate format is a simple way to help you manage how you eat. You plan meals by learning how much space each food should take on a plate. Using the plate format helps you spread carbohydrate throughout the day. It can make it easier to keep your blood sugar level within your target range. It also helps you see if you're eating healthy portion sizes. To use the plate format, you put non-starchy vegetables on half your plate. Add meat or meat substitutes on one-quarter of the plate. Put a grain or starchy vegetable (such as brown rice or a potato) on the final quarter of the plate. You can add a small piece of fruit and some low-fat or fat-free milk or yogurt, depending on your carbohydrate goal for each meal.  Here are some tips for using the plate format:  · Make sure that you are not using an oversized plate. A 9-inch plate is best. Many restaurants use larger plates. · Get used to using the plate format at home. Then you can use it when you eat out. · Write down your questions about using the plate format. Talk to your doctor, a dietitian, or a diabetes educator about your concerns. Carbohydrate counting  With carbohydrate counting, you plan meals based on the amount of carbohydrate in each food. Carbohydrate raises blood sugar higher and more quickly than any other nutrient. It is found in desserts, breads and cereals, and fruit. It's also found in starchy vegetables such as potatoes and corn, grains such as rice and pasta, and milk and yogurt. Spreading carbohydrate throughout the day helps keep your blood sugar levels within your target range. Your daily amount depends on several things, including your weight, how active you are, which diabetes medicines you take, and what your goals are for your blood sugar levels. A registered dietitian or diabetes educator can help you plan how much carbohydrate to include in each meal and snack.   A guideline for your daily amount of carbohydrate is:  · 45 to 60 grams at each meal. That's about the same as 3 to 4 carbohydrate servings. · 15 to 20 grams at each snack. That's about the same as 1 carbohydrate serving. The Nutrition Facts label on packaged foods tells you how much carbohydrate is in a serving of the food. First, look at the serving size on the food label. Is that the amount you eat in a serving? All of the nutrition information on a food label is based on that serving size. So if you eat more or less than that, you'll need to adjust the other numbers. Total carbohydrate is the next thing you need to look for on the label. If you count carbohydrate servings, one serving of carbohydrate is 15 grams. For foods that don't come with labels, such as fresh fruits and vegetables, you'll need a guide that lists carbohydrate in these foods. Ask your doctor, dietitian, or diabetes educator about books or other nutrition guides you can use. If you take insulin, you need to know how many grams of carbohydrate are in a meal. This lets you know how much rapid-acting insulin to take before you eat. If you use an insulin pump, you get a constant rate of insulin during the day. So the pump must be programmed at meals to give you extra insulin to cover the rise in blood sugar after meals. When you know how much carbohydrate you will eat, you can take the right amount of insulin. Or, if you always use the same amount of insulin, you need to make sure that you eat the same amount of carbohydrate at meals. If you need more help to understand carbohydrate counting and food labels, ask your doctor, dietitian, or diabetes educator. How do you get started with meal planning? Here are some tips to get started:  · Plan your meals a week at a time. Don't forget to include snacks too. · Use cookbooks or online recipes to plan several main meals. Plan some quick meals for busy nights. You also can double some recipes that freeze well. Then you can save half for other busy nights when you don't have time to cook.   · Make sure you have the ingredients you need for your recipes. If you're running low on basic items, put these items on your shopping list too. · List foods that you use to make breakfasts, lunches, and snacks. List plenty of fruits and vegetables. · Post this list on the refrigerator. Add to it as you think of more things you need. · Take the list to the store to do your weekly shopping. Follow-up care is a key part of your treatment and safety. Be sure to make and go to all appointments, and call your doctor if you are having problems. It's also a good idea to know your test results and keep a list of the medicines you take. Where can you learn more? Go to https://EadBoxpeNellOne Therapeuticseb.SciAps. org and sign in to your [employer] account. Enter S094 in the [employer] box to learn more about \"Learning About Meal Planning for Diabetes. \"     If you do not have an account, please click on the \"Sign Up Now\" link. Current as of: December 20, 2019               Content Version: 12.5  © 9925-5676 Healthwise, Incorporated. Care instructions adapted under license by [facility] ([facility]). If you have questions about a medical condition or this instruction, always ask your healthcare professional. [address] any warranty or liability for your use of this information.

## 2020-08-25 ENCOUNTER — TELEPHONE (OUTPATIENT)
Dept: FAMILY MEDICINE CLINIC | Age: 60
End: 2020-08-25

## 2020-08-27 DIAGNOSIS — Z20.2 EXPOSURE TO STD: ICD-10-CM

## 2020-08-27 DIAGNOSIS — A59.9 TRICHOMONAS INFECTION: Primary | ICD-10-CM

## 2020-08-27 LAB
HIV AG/AB: NORMAL
HIV ANTIGEN: NORMAL
HIV-1 ANTIBODY: NORMAL
HIV-2 AB: NORMAL
SPECIMEN SOURCE: ABNORMAL
T. VAGINALIS AMPLIFIED: POSITIVE
TOTAL SYPHILLIS IGG/IGM: NORMAL

## 2020-08-27 RX ORDER — METRONIDAZOLE 500 MG/1
2000 TABLET ORAL ONCE
Qty: 4 TABLET | Refills: 0 | Status: SHIPPED | OUTPATIENT
Start: 2020-08-27 | End: 2020-08-27

## 2020-08-28 ENCOUNTER — TELEPHONE (OUTPATIENT)
Dept: FAMILY MEDICINE CLINIC | Age: 60
End: 2020-08-28

## 2020-09-15 RX ORDER — NAPROXEN 500 MG/1
500 TABLET ORAL DAILY
Qty: 90 TABLET | Refills: 1 | Status: SHIPPED | OUTPATIENT
Start: 2020-09-15 | End: 2020-10-22 | Stop reason: SDUPTHER

## 2020-09-15 RX ORDER — METFORMIN HYDROCHLORIDE 500 MG/1
TABLET, EXTENDED RELEASE ORAL
Qty: 90 TABLET | Refills: 1 | Status: SHIPPED | OUTPATIENT
Start: 2020-09-15 | End: 2020-12-28 | Stop reason: SDUPTHER

## 2020-10-13 ENCOUNTER — TELEPHONE (OUTPATIENT)
Dept: FAMILY MEDICINE CLINIC | Age: 60
End: 2020-10-13

## 2020-10-13 NOTE — TELEPHONE ENCOUNTER
THERE WAS A RECALL ON TWO BRANDS FROM [employer] AND [employer]- -HE SHOULD CALL THE  PHARMACY TO SEE WHICH COMPANY MADE HIS- IF HE STILL CONCERNED I CAN CHANGE TO GLIPIZIDE

## 2020-10-13 NOTE — TELEPHONE ENCOUNTER
Patient is calling because he is concern about taking METFORMIN - HEARD IT CAN CAUSE CANCER. Please give him a call back. [address] [facility]. Phone no.  [phone]

## 2020-10-22 ENCOUNTER — OFFICE VISIT (OUTPATIENT)
Dept: FAMILY MEDICINE CLINIC | Age: 60
End: 2020-10-22
Payer: COMMERCIAL

## 2020-10-22 VITALS
HEART RATE: 94 BPM | TEMPERATURE: 97.8 F | WEIGHT: 278 LBS | HEIGHT: 74 IN | DIASTOLIC BLOOD PRESSURE: 78 MMHG | RESPIRATION RATE: 18 BRPM | SYSTOLIC BLOOD PRESSURE: 126 MMHG | OXYGEN SATURATION: 98 % | BODY MASS INDEX: 35.68 KG/M2

## 2020-10-22 LAB — PROSTATE SPECIFIC ANTIGEN: 2.21 NG/ML (ref 0–4)

## 2020-10-22 PROCEDURE — 90688 IIV4 VACCINE SPLT 0.5 ML IM: CPT | Performed by: NURSE PRACTITIONER

## 2020-10-22 PROCEDURE — 99396 PREV VISIT EST AGE 40-64: CPT | Performed by: NURSE PRACTITIONER

## 2020-10-22 PROCEDURE — 36415 COLL VENOUS BLD VENIPUNCTURE: CPT | Performed by: NURSE PRACTITIONER

## 2020-10-22 PROCEDURE — 90471 IMMUNIZATION ADMIN: CPT | Performed by: NURSE PRACTITIONER

## 2020-10-22 RX ORDER — NAPROXEN 500 MG/1
500 TABLET ORAL 2 TIMES DAILY WITH MEALS
Qty: 180 TABLET | Refills: 1 | Status: SHIPPED | OUTPATIENT
Start: 2020-10-22 | End: 2020-12-28

## 2020-10-22 NOTE — PATIENT INSTRUCTIONS
during a routine doctor visit. Your doctor will tell you how often to check your blood pressure based on your age, your blood pressure results, and other factors. · Prostate exam. Talk to your doctor about whether you should have a blood test (called a PSA test) for prostate cancer. Experts recommend that you discuss the benefits and risks of the test with your doctor before you decide whether to have this test.  · Diabetes. Ask your doctor whether you should have tests for diabetes. · Vision. Some experts recommend that you have yearly exams for glaucoma and other age-related eye problems starting at age 48. · Hearing. Tell your doctor if you notice any change in your hearing. You can have tests to find out how well you hear. · Colorectal cancer. Your risk for colorectal cancer gets higher as you get older. Some experts say that adults should start regular screening at age 48 and stop at age 76. Others say to start before age 48 or continue after age 76. Talk with your doctor about your risk and when to start and stop screening. · Heart attack and stroke risk. At least every 4 to 6 years, you should have your risk for heart attack and stroke assessed. Your doctor uses factors such as your age, blood pressure, cholesterol, and whether you smoke or have diabetes to show what your risk for a heart attack or stroke is over the next 10 years. · Abdominal aortic aneurysm. Ask your doctor whether you should have a test to check for an aneurysm. You may need a test if you ever smoked or if your parent, brother, sister, or child has had an aneurysm. When should you call for help? Watch closely for changes in your health, and be sure to contact your doctor if you have any problems or symptoms that concern you. Where can you learn more? Go to https://fredi.[employer] Cressey. org and sign in to your [employer] account.  Enter S015 in the [facility] box to learn more about \"Well Visit, Men 48 to 72: Care Instructions. \"     If you do not have an account, please click on the \"Sign Up Now\" link. Current as of: May 27, 2020               Content Version: 12.6  © 2006-2020 [employer]. Care instructions adapted under license by [facility] ([facility]). If you have questions about a medical condition or this instruction, always ask your healthcare professional. [address] any warranty or liability for your use of this information.
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
## 2020-10-26 DIAGNOSIS — Z12.5 PROSTATE CANCER SCREENING: ICD-10-CM

## 2020-10-26 DIAGNOSIS — E11.9 TYPE 2 DIABETES MELLITUS WITHOUT COMPLICATION, WITHOUT LONG-TERM CURRENT USE OF INSULIN (HCC): ICD-10-CM

## 2020-10-26 DIAGNOSIS — Z13.220 LIPID SCREENING: ICD-10-CM

## 2020-10-26 LAB
A/G RATIO: 2.2 (ref 1.1–2.2)
ALBUMIN SERPL-MCNC: 4.7 G/DL (ref 3.4–5)
ALP BLD-CCNC: 63 U/L (ref 40–129)
ALT SERPL-CCNC: 11 U/L (ref 10–40)
ANION GAP SERPL CALCULATED.3IONS-SCNC: 9 MMOL/L (ref 3–16)
AST SERPL-CCNC: 20 U/L (ref 15–37)
BILIRUB SERPL-MCNC: 0.4 MG/DL (ref 0–1)
BUN BLDV-MCNC: 17 MG/DL (ref 7–20)
CALCIUM SERPL-MCNC: 9.9 MG/DL (ref 8.3–10.6)
CHLORIDE BLD-SCNC: 104 MMOL/L (ref 99–110)
CHOLESTEROL, TOTAL: 174 MG/DL (ref 0–199)
CO2: 28 MMOL/L (ref 21–32)
CREAT SERPL-MCNC: 1.3 MG/DL (ref 0.8–1.3)
GFR AFRICAN AMERICAN: >60
GFR NON-AFRICAN AMERICAN: 56
GLOBULIN: 2.1 G/DL
GLUCOSE BLD-MCNC: 125 MG/DL (ref 70–99)
HDLC SERPL-MCNC: 36 MG/DL (ref 40–60)
LDL CHOLESTEROL CALCULATED: 120 MG/DL
POTASSIUM SERPL-SCNC: 4.6 MMOL/L (ref 3.5–5.1)
PROSTATE SPECIFIC ANTIGEN: 2.39 NG/ML (ref 0–4)
SODIUM BLD-SCNC: 141 MMOL/L (ref 136–145)
TOTAL PROTEIN: 6.8 G/DL (ref 6.4–8.2)
TRIGL SERPL-MCNC: 88 MG/DL (ref 0–150)
VLDLC SERPL CALC-MCNC: 18 MG/DL
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
## 2020-11-17 ENCOUNTER — OFFICE VISIT (OUTPATIENT)
Dept: FAMILY MEDICINE CLINIC | Age: 60
End: 2020-11-17
Payer: COMMERCIAL

## 2020-11-17 VITALS
DIASTOLIC BLOOD PRESSURE: 64 MMHG | TEMPERATURE: 98.6 F | BODY MASS INDEX: 35.94 KG/M2 | SYSTOLIC BLOOD PRESSURE: 110 MMHG | HEIGHT: 74 IN | WEIGHT: 280 LBS | RESPIRATION RATE: 16 BRPM | HEART RATE: 68 BPM | OXYGEN SATURATION: 98 %

## 2020-11-17 LAB
CREATININE URINE POCT: NORMAL
MICROALBUMIN/CREAT 24H UR: NORMAL MG/G{CREAT}
MICROALBUMIN/CREAT UR-RTO: NORMAL
SPECIMEN TYPE: NORMAL
TRICHOMONAS VAGINALIS SCREEN: NEGATIVE

## 2020-11-17 PROCEDURE — 99214 OFFICE O/P EST MOD 30 MIN: CPT | Performed by: NURSE PRACTITIONER

## 2020-11-17 PROCEDURE — 87808 TRICHOMONAS ASSAY W/OPTIC: CPT | Performed by: NURSE PRACTITIONER

## 2020-11-17 PROCEDURE — 82044 UR ALBUMIN SEMIQUANTITATIVE: CPT | Performed by: NURSE PRACTITIONER

## 2020-11-17 ASSESSMENT — ENCOUNTER SYMPTOMS
COLOR CHANGE: 0
NAUSEA: 0
DIARRHEA: 0
SINUS PRESSURE: 0
EYE DISCHARGE: 0
ABDOMINAL PAIN: 0
SHORTNESS OF BREATH: 0
CONSTIPATION: 0
COUGH: 0
BACK PAIN: 0
ABDOMINAL DISTENTION: 0
CHEST TIGHTNESS: 0

## 2020-11-17 NOTE — PATIENT INSTRUCTIONS
Recommend eye exam before the end of the year    Call insurance company to discuss coverage for shingles vaccine (Shingrix) 2 dose series     Avoid NSAIDs 7 days prior to surgery. NSAIDs include naproxen, naprosyn, ibuprofen, aleve, advil, motrin    Hold Metformin day before surgery    Patient Education        Knee Arthroscopy: Before Your Surgery  What is knee arthroscopy? Arthroscopy is a way to find problems and do surgery inside a joint without making a large cut (incision). Your doctor puts a lighted tube with a tiny camera and surgical tools through small incisions in your knee. The camera is called an arthroscope, or scope. In this surgery, your doctor may:  · Remove or repair a torn piece of cartilage or loose bone. · Replace a torn anterior cruciate ligament (ACL) with a piece of tissue. This repair is called a graft. · Smooth the rough surfaces of your joint. Most people go home on the day of the surgery or the next day. If you have a simple injury, it may take at least 6 weeks to recover. It may take longer if your doctor had to repair damaged tissue. You will need to limit activity while your knee heals. You may need to have physical therapy (rehab) to help your knee get stronger. If you have a desk job, you may be able to go back to work a few days after treatment of a simple injury. If you lift things or stand or walk a lot at work, it may be 2 to 6 weeks before you can go back. After surgery and rehab, you will probably have less pain. Your knee should be stronger. You should be able to use your knee and leg better. Some people have to avoid lifting heavy objects. Follow-up care is a key part of your treatment and safety. Be sure to make and go to all appointments, and call your doctor if you are having problems. It's also a good idea to know your test results and keep a list of the medicines you take. How do you prepare for surgery? Surgery can be stressful.  This information will help to 2 hours. It depends on how much repair needs to be done to your knee. When should you call your doctor? · You have questions or concerns.     · You don't understand how to prepare for your surgery.     · You become ill before the surgery (such as fever, flu, or a cold).     · You need to reschedule or have changed your mind about having the surgery. Where can you learn more? Go to https://HealionicspepoloReframe It.DailyPath. org and sign in to your [employer] account. Enter W046 in the [employer] box to learn more about \"Knee Arthroscopy: Before Your Surgery. \"     If you do not have an account, please click on the \"Sign Up Now\" link. Current as of: March 2, 2020               Content Version: 12.6  © 2006-2020 [employer]. Care instructions adapted under license by [facility]. If you have questions about a medical condition or this instruction, always ask your healthcare professional. [place] [address] any warranty or liability for your use of this information. Patient Education        Knee Arthroscopy: What to Expect at Home  Your Recovery     Arthroscopy is a way to find problems and do surgery inside a joint without making a large cut (incision). Your doctor put a lighted tube with a tiny camera--called an arthroscope, or scope--and surgical tools through small incisions in your knee. You will feel tired for several days. Your knee will be swollen. And you may notice that your skin is a different color near the cuts (incisions). The swelling is normal and will start to go away in a few days. Keeping your leg higher than your heart will help with swelling and pain. You will probably need about 6 weeks to recover. If your doctor repaired damaged tissue, recovery will take longer.  You may have to limit your activity until your knee strength and movement are back to normal. You may also be in a physical rehabilitation (rehab) program.  You may be able to return to a desk job or your normal routine in a few days. But if you do physical labor, it may be as long as 2 months before you can go back to work. This care sheet gives you a general idea about how long it will take for you to recover. But each person recovers at a different pace. Follow the steps below to get better as quickly as possible. How can you care for yourself at home? Activity    · Rest when you feel tired. Getting enough sleep will help you recover. Use pillows to raise your ankle and leg above the level of your heart.     · Try to walk each day, after your doctor has said you can. Start by walking a little more than you did the day before. Bit by bit, increase the amount you walk. Walking boosts blood flow and helps prevent pneumonia and constipation.     · You may have a brace or crutches or both.     · Your doctor will tell you how often and how much you can move your leg and knee.     · If you have a desk job, you may be able to return to work a few days after the surgery. If you lift things or stand or walk a lot at work, it may be as long as 2 months before you can return.     · You can take a shower 48 to 72 hours after surgery and clean the incisions with regular soap and water. Do not take a bath or soak your knee until your doctor says it is okay.     · Ask your doctor when you can drive again.     · If you had a repair of torn tissue, follow your doctor's instructions for lifting things or moving your knee. Diet    · You can eat your normal diet. If your stomach is upset, try bland, low-fat foods like plain rice, broiled chicken, toast, and yogurt.     · Drink plenty of fluids, unless your doctor tells you not to.     · You may notice that your bowel movements are not regular right after your surgery. This is common. Try to avoid constipation and straining with bowel movements. You may want to take a fiber supplement every day.  If you have not had a bowel movement after a couple of days, ask your doctor about taking a mild laxative. Medicines    · Your doctor will tell you if and when you can restart your medicines. He or she will also give you instructions about taking any new medicines.     · If you take aspirin or some other blood thinner, ask your doctor if and when to start taking it again. Make sure that you understand exactly what your doctor wants you to do.     · Be safe with medicines. Take pain medicines exactly as directed. ? If the doctor gave you a prescription medicine for pain, take it as prescribed. ? If you are not taking a prescription pain medicine, ask your doctor if you can take an over-the-counter medicine.     · If you think your pain medicine is making you sick to your stomach:  ? Take your medicine after meals (unless your doctor has told you not to). ? Ask your doctor for a different pain medicine.     · If your doctor prescribed antibiotics, take them as directed. Do not stop taking them just because you feel better. You need to take the full course of antibiotics. Incision care    · If you have a dressing over your cuts (incisions), keep it clean and dry. You may remove it 48 to 72 hours after the surgery.     · If your incisions are open to the air, keep the area clean and dry.     · If you have strips of tape on the incisions, leave the tape on for a week or until it falls off. Exercise    · Move your toes and ankle as much as your bandages will allow.     · Bend and straighten your knee slowly several times during the day.     · Depending on why you had the surgery, you may have to do ankle and leg exercises. Your doctor or physical therapist will give you exercises as part of a rehabilitation program.     · Stop any activity that causes sharp pain. Talk to your doctor or physical therapist about what sports or other exercise you can do. Ice    · To reduce swelling and pain, put ice or a cold pack on your knee for 10 to 20 minutes at a time.  Do this every 1 to 2 hours. Put a thin cloth between the ice and your skin. Follow-up care is a key part of your treatment and safety. Be sure to make and go to all appointments, and call your doctor if you are having problems. It's also a good idea to know your test results and keep a list of the medicines you take. When should you call for help? Call 911 anytime you think you may need emergency care. For example, call if:    · You passed out (lost consciousness).     · You have severe trouble breathing.     · You have sudden chest pain and shortness of breath, or you cough up blood. Call your doctor now or seek immediate medical care if:    · Your foot or toes are numb or tingling.     · Your foot is cool or pale, or it changes color.     · You have signs of a blood clot, such as:  ? Pain in your calf, back of the knee, thigh, or groin. ? Redness and swelling in your leg or groin.     · You are sick to your stomach or cannot keep fluids down.     · You have pain that does not get better after you take pain medicine.     · You have loose stitches, or your incision comes open.     · Bright red blood has soaked through the bandage over your incision.     · You have signs of infection, such as:  ? Increased pain, swelling, warmth, or redness. ? Red streaks leading from the incisions. ? Pus draining from the incisions. ? A fever. Watch closely for any changes in your health, and be sure to contact your doctor if:    · You do not have a bowel movement after taking a laxative. Where can you learn more? Go to https://Optifyedmundo.Libboo. org and sign in to your [employer] account. Enter R809 in the [facility] box to learn more about \"Knee Arthroscopy: What to Expect at Home. \"     If you do not have an account, please click on the \"Sign Up Now\" link. Current as of: March 2, 2020               Content Version: 12.6  © 7680-7969 [employer].    Care instructions adapted under license by [facility] ([facility]). If you have questions about a medical condition or this instruction, always ask your healthcare professional. [place] [address] any warranty or liability for your use of this information.

## 2020-11-21 LAB
C. TRACHOMATIS DNA ,URINE: NEGATIVE
N. GONORRHOEAE DNA, URINE: NEGATIVE

## 2020-12-23 ENCOUNTER — HOSPITAL ENCOUNTER (OUTPATIENT)
Dept: PHYSICAL THERAPY | Age: 60
Setting detail: THERAPIES SERIES
Discharge: HOME | End: 2020-12-23
Payer: COMMERCIAL

## 2020-12-23 PROCEDURE — 97161 PT EVAL LOW COMPLEX 20 MIN: CPT

## 2020-12-23 PROCEDURE — 97140 MANUAL THERAPY 1/> REGIONS: CPT

## 2020-12-23 PROCEDURE — 97110 THERAPEUTIC EXERCISES: CPT

## 2020-12-23 NOTE — FLOWSHEET NOTE
[facility]  Outpatient Rehabilitation & Therapy  [address]  [place] [address]  Phone: [phone]   Fax:     [phone]      Physical Therapy Treatment Note/ Progress Report:     Date:  2020    Patient Name:  Carlos Romero    :  1960  MRN: [id number]    Pertinent Medical History:     Medical/Treatment Diagnosis Information:  · Diagnosis: s/p Left Totatal knee replacement  · Treatment Diagnosis: Gait and mobility dysfunction s/p left TKA    Insurance/Certification information:  PT Insurance Information: [facility]  Physician Information:  Referring Practitioner: Dr Diane Reyes of care signed (Y/N):     Date of Patient follow up with Physician:      Progress Report: []  Yes  [x]  No     Date Range for reporting period:  Beginnin2020  Ending:      Progress report due (10 Rx/or 30 days whichever is less):        Recertification due (POC duration/ or 90 days whichever is less):     Visit # POC/Insurance Allowable Auth Needed   1 BOMN []Yes   [x]No     Latex Allergy:  [x]NO      []YES  Preferred Language for Healthcare:   [x]English       []Other:    Functional Scale:      Date assessed: at eval  Test: LEFS  Score:    Pain level:  5/10     History of Injury: h/o left knee pain ~ 20 years, arthroscopy , gradual worsening over the years     SUBJECTIVE:  See eval    OBJECTIVE:  ? Observation:   ? Test measurements:      RESTRICTIONS/PRECAUTIONS: none     Exercises/Interventions:     Therapeutic Ex (41012)   Min: Reps/Resistance Notes/CUES   Nu step     Leg press     incline     qss 30\" x 4                        Mat ex     Seated chair stretch  30\" x 4     Strap assist knee flex     Strap assist quad setting  10\" x 12     LAQ     Hams curl                    Manual Intervention (44270)  Min: 12 min    Knee mobs/PROM Flex/ ext aa/prom    Tib/Fem Mobs     Patella Mobs Sup/ inf grade 3     Ankle mobs NMR re-education (91349)  Min:  CUES NEEDED             Therapeutic Activity (42209)  Min:               Modalities  Min:     IFC with      CP after exercises     MH after exercises            Other Therapeutic Activities: Pt was educated on PT POC, Diagnosis, Prognosis, pathomechanics as well as frequency and duration of scheduling future physical therapy appointments. Time was also taken on this day to answer all patient questions and participation in PT. Reviewed appointment policy in detail with patient and patient verbalized understanding. Home Exercise Program:   HEP instruction:  Access Code: [email or web address]   URL: [employer]/   Date: 12/23/2020   Prepared by: Josue Hernandez     Exercises   · Seated Knee Flexion Stretch - 4 reps - 1 sets - 30 hold - 3x daily - 7x weekly   · Long Sitting Knee Extension with Towel Foot Lift - 12 reps - 1 sets - 10 hold - 3x daily - 7x weekly   Patient was instructed in the following for HEP:     . Patient verbalized/demonstrated understanding and was issued written handout. Therapeutic Exercise and NMR EXR  [] (82857) Provided verbal/tactile cueing for activities related to strengthening, flexibility, endurance, ROM for improvements in LE, proximal hip, and core control with self care, mobility, lifting, ambulation.  [] (84718) Provided verbal/tactile cueing for activities related to improving balance, coordination, kinesthetic sense, posture, motor skill, proprioception  to assist with LE, proximal hip, and core control in self care, mobility, lifting, ambulation and eccentric single leg control. NMR and Therapeutic Activities:    [] (56341 or 70042) Provided verbal/tactile cueing for activities related to improving balance, coordination, kinesthetic sense, posture, motor skill, proprioception and motor activation to allow for proper function of core, proximal hip and LE with self care and ADLs and functional mobility. [x]? Progressing: []? Met: []? Not Met: []? Adjusted     Therapist goals for Patient:   Short Term Goals: To be achieved in: 2 weeks  1. Independent in HEP and progression per patient tolerance, in order to prevent re-injury. [x]? Progressing: []? Met: []? Not Met: []? Adjusted        Long Term Goals: To be achieved in: 6 weeks  1. Disability index score of 44 or better for the LEFS to assist with reaching prior level of function. [x]? Progressing: []? Met: []? Not Met: []? Adjusted  2. Patient will demonstrate increased AROM to 3-110 to allow for proper joint functioning as indicated by patients Functional Deficits. [x]? Progressing: []? Met: []? Not Met: []? Adjusted  3. Patient will demonstrate an increase in Strength to 4/5  proximal hip strength and knee  Control in LE to allow for proper functional mobility as indicated by patients Functional Deficits. [x]? Progressing: []? Met: []? Not Met: []? Adjusted  4. Patient will return to normal gait pattern at a community level   [x]? Progressing: []? Met: []? Not Met: []? Adjusted     ASSESSMENT:  See eval    Treatment/Activity Tolerance:  [x] Patient tolerated treatment well [] Patient limited by fatique  [] Patient limited by pain  [] Patient limited by other medical complications  [] Other:     Overall Progression Towards Functional goals/ Treatment Progress Update:  [] Patient is progressing as expected towards functional goals listed. [] Progression is slowed due to complexities/Impairments listed. [] Progression has been slowed due to co-morbidities.   [x] Plan just implemented, too soon to assess goals progression <30days   [] Goals require adjustment due to lack of progress  [] Patient is not progressing as expected and requires additional follow up with physician  [] Other    Prognosis for POC: [x] Good [] Fair  [] Poor    Patient requires continued skilled intervention: [x] Yes  [] No        PLAN:

## 2020-12-23 NOTE — PROGRESS NOTES
Jaime. Rick Hernandez [address]  Phone: [phone]   Fax:     [phone]                                                       Physical Therapy Certification    Dear Referring Practitioner: Dr Anthony Armenta,    We had the pleasure of evaluating the following patient for physical therapy services at [facility]. A summary of our findings can be found in the initial assessment below. This includes our plan of care. If you have any questions or concerns regarding these findings, please do not hesitate to contact me at the office phone number checked above.   Thank you for the referral.       Physician Signature:_______________________________Date:__________________  By signing above (or electronic signature), therapists plan is approved by physician          Patient: Jaxson Hughes   : 1960   MRN: [id number]  Referring Physician: Referring Practitioner: Dr Anthony Armenta      Evaluation Date: 2020      Medical Diagnosis Information:  Diagnosis: s/p Left Totatal knee replacement   Treatment Diagnosis: Gait and mobility dysfunction s/p left TKA                                         Insurance information: PT Insurance Information: Jim Eduar     Precautions/ Contra-indications: not a fall risk   Latex Allergy:  [x]NO      []YES  Preferred Language for Healthcare:   [x]English       []other:    C-SSRS Triggered by Intake questionnaire (Past 2 wk assessment ):   [x] No, Questionnaire did not trigger screening.   [] Yes, Patient intake triggered C-SSRS Screening      [] C-SSRS Screening completed  [] PCP notified via Epic     SUBJECTIVE: c/o left knee and occasional left hip pain    Relevant Medical History:   Functional Outcome Measure: LEFS 35     Pain Scale: 4-6/10  Easing factors: ice/ meds Provocative factors: bending knee and standing on it riding in wifes car vs his truck    Type: [x]Constant   []Intermittent  []Radiating []Localized []other:     Numbness/Tingling: none    Occupation/School:off work at this time, full time [employer]    Living Status/Prior Level of Function: Independent with ADLs and IADLs     OBJECTIVE:     Posture: fair     Functional Mobility/Transfers: independent with sit <> stand and bed mobility     Palpation: ttp medial left knee     Bandages/Dressings/Incisions: bandage intact cover surgical scar , no evidence of drainage or acute erythema     Gait: (include devices/WB status) walks with a spc held at right hand , decreased step length, decreased knee flexion/ ext and decreased plantar flexion     ROM LEFT RIGHT   HIP Flex     HIP Abd     HIP Ext     HIP IR     HIP ER     Knee ext -10, prom -8    Knee Flex 79, prom 84    Ankle PF     Ankle DF     Ankle In     Ankle Ev     Strength  LEFT RIGHT   HIP Flexors     HIP Abductors     HIP Ext     Hip ER     Knee EXT (quad) 4- 5   Knee Flex (HS) 4- 5   Ankle DF     Ankle PF     Ankle Inv     Ankle EV          Circumference  Mid apex  7 cm prox             Reflexes/Sensation:    [x]Dermatomes/Myotomes intact    [x]Reflexes equal and normal bilaterally   []Other:    Joint mobility:  L knee    []Normal    [x]Hypo   []Hyper    Orthopedic Special Tests:                        [x] Patient history, allergies, meds reviewed. Medical chart reviewed. See intake form. Review Of Systems (ROS):  [x]Performed Review of systems (Integumentary, CardioPulmonary, Neurological) by intake and observation. Intake form has been scanned into medical record. Patient has been instructed to contact their primary care physician regarding ROS issues if not already being addressed at this time.       Co-morbidities/Complexities (which will affect course of rehabilitation):   []None           Arthritic conditions   []Rheumatoid arthritis (M05.9) [x]Osteoarthritis (M19.91)   Cardiovascular conditions   []Hypertension (I10)  []Hyperlipidemia (E78.5)  []Angina pectoris (I20)  []Atherosclerosis (I70)  []CVA Musculoskeletal conditions   []Disc pathology   []Congenital spine pathologies   []Prior surgical intervention  []Osteoporosis (M81.8)  []Osteopenia (M85.8)   Endocrine conditions   []Hypothyroid (E03.9)  []Hyperthyroid Gastrointestinal conditions   []Constipation (G19.34)   Metabolic conditions   []Morbid obesity (E66.01)  [x]Diabetes type 1(E10.65) or 2 (E11.65)   []Neuropathy (G60.9)     Pulmonary conditions   []Asthma (J45)  []Coughing   []COPD (J44.9)   Psychological Disorders  []Anxiety (F41.9)  []Depression (F32.9)   []Other:   []Other:          Barriers to/and or personal factors that will affect rehab potential:              []Age  []Sex    []Smoker              []Motivation/Lack of Motivation                        [x]Co-Morbidities              []Cognitive Function, education/learning barriers              []Environmental, home barriers              []profession/work barriers  []past PT/medical experience  []other:  Justification    Falls Risk Assessment (30 days):   [x] Falls Risk assessed and no intervention required.   [] Falls Risk assessed and Patient requires intervention due to being higher risk   TUG score (>12s at risk):     [] Falls education provided, including          ASSESSMENT:   Functional Impairments:     []Noted lumbar/proximal hip/LE hypomobility   [x]Decreased LE functional ROM   []Decreased core/proximal hip strength and neuromuscular control   [x]Decreased LE functional strength   []Reduced balance/proprioceptive control   []other:      Functional Activity Limitations (from functional questionnaire and intake)   [x]Reduced ability to tolerate prolonged functional positions   []Reduced ability or difficulty with changes of positions or transfers between positions [x]Reduced ability to maintain good posture and demonstrate good body mechanics with sitting, bending, and lifting   []Reduced ability to sleep   [x] Reduced ability or tolerance with driving and/or computer work   [x]Reduced ability to perform lifting, carrying tasks   [x]Reduced ability to squat   []Reduced ability to forward bend   [x]Reduced ability to ambulate prolonged functional periods/distances/surfaces   [x]Reduced ability to ascend/descend stairs   []Reduced ability to run, hop or jump   []other:     Participation Restrictions   []Reduced participation in self care activities   [x]Reduced participation in home management activities   [x]Reduced participation in work activities   []Reduced participation in social activities. []Reduced participation in sport activities. Classification :    [x]Signs/symptoms consistent with post-surgical status including decreased ROM, strength and function.    []Signs/symptoms consistent with joint sprain/strain   []Signs/symptoms consistent with patella-femoral syndrome   []Signs/symptoms consistent with knee OA/hip OA   []Signs/symptoms consistent with internal derangement of knee/Hip   []Signs/symptoms consistent with functional hip weakness/NMR control      []Signs/symptoms consistent with tendinitis/tendinosis    []signs/symptoms consistent with pathology which may benefit from Dry needling      []other:      Prognosis/Rehab Potential:      [x]Excellent   [x]Good    []Fair   []Poor    Tolerance of evaluation/treatment:    []Excellent   [x]Good    []Fair   []Poor    Physical Therapy Evaluation Complexity Justification  [x] A history of present problem with:  [] no personal factors and/or comorbidities that impact the plan of care;  [x]1-2 personal factors and/or comorbidities that impact the plan of care  []3 personal factors and/or comorbidities that impact the plan of care [x] An examination of body systems using standardized tests and measures addressing any of the following: body structures and functions (impairments), activity limitations, and/or participation restrictions;:  [x] a total of 1-2 or more elements   [] a total of 3 or more elements   [] a total of 4 or more elements   [x] A clinical presentation with:  [x] stable and/or uncomplicated characteristics   [] evolving clinical presentation with changing characteristics  [] unstable and unpredictable characteristics;   [x] Clinical decision making of [] low, [] moderate, [] high complexity using standardized patient assessment instrument and/or measurable assessment of functional outcome. [x] EVAL (LOW) 44504 (typically 20 minutes face-to-face)  [] EVAL (MOD) 14957 (typically 30 minutes face-to-face)  [] EVAL (HIGH) 15628 (typically 45 minutes face-to-face)  [] RE-EVAL     PLAN:  Frequency/Duration: 3days per week for 4-6Weeks:  Interventions:  [x]  Therapeutic exercise including: strength training, ROM, for Lower extremity and core   [x]  NMR activation and proprioception for LE, Glutes and Core   [x]  Manual therapy as indicated for LE, Hip and spine to include: Dry Needling/IASTM, STM, PROM, Gr I-IV mobilizations, manipulation. [x] Modalities as needed that may include: thermal agents, E-stim, Biofeedback, US, iontophoresis as indicated  [x] Patient education on joint protection, postural re-education, activity modification, progression of HEP. HEP instruction:  Access Code: [email or web address]   URL: [email or web address]   Date: 12/23/2020   Prepared by: Pamela Leiva     Exercises   Seated Knee Flexion Stretch - 4 reps - 1 sets - 30 hold - 3x daily - 7x weekly   Long Sitting Knee Extension with Towel Foot Lift - 12 reps - 1 sets - 10 hold - 3x daily - 7x weekly   The patient demonstrated good tolerance to and understanding of the HEP. Written instructions have been issued.   GOALS: Patient stated goal: improved range and strength   [x] Progressing: [] Met: [] Not Met: [] Adjusted    Therapist goals for Patient:   Short Term Goals: To be achieved in: 2 weeks  1. Independent in HEP and progression per patient tolerance, in order to prevent re-injury. [x] Progressing: [] Met: [] Not Met: [] Adjusted      Long Term Goals: To be achieved in: 6 weeks  1. Disability index score of 44 or better for the LEFS to assist with reaching prior level of function. [x] Progressing: [] Met: [] Not Met: [] Adjusted  2. Patient will demonstrate increased AROM to 3-110 to allow for proper joint functioning as indicated by patients Functional Deficits. [x] Progressing: [] Met: [] Not Met: [] Adjusted  3. Patient will demonstrate an increase in Strength to 4/5  proximal hip strength and knee  Control in LE to allow for proper functional mobility as indicated by patients Functional Deficits. [x] Progressing: [] Met: [] Not Met: [] Adjusted  4. Patient will return to normal gait pattern at a community level   [x] Progressing: [] Met: [] Not Met: [] Adjusted      Electronically signed by:  Brooke Coley PT      Note: If patient does not return for scheduled/recommended follow up visits, this note will serve as a discharge from care along with the most recent update on progress.

## 2020-12-28 ENCOUNTER — OFFICE VISIT (OUTPATIENT)
Dept: FAMILY MEDICINE CLINIC | Age: 60
End: 2020-12-28
Payer: COMMERCIAL

## 2020-12-28 VITALS
HEART RATE: 97 BPM | HEIGHT: 74 IN | OXYGEN SATURATION: 98 % | TEMPERATURE: 97.6 F | SYSTOLIC BLOOD PRESSURE: 124 MMHG | DIASTOLIC BLOOD PRESSURE: 76 MMHG | BODY MASS INDEX: 34.52 KG/M2 | WEIGHT: 269 LBS

## 2020-12-28 LAB
ANION GAP SERPL CALCULATED.3IONS-SCNC: 14 MMOL/L (ref 3–16)
ANISOCYTOSIS: ABNORMAL
BASOPHILS ABSOLUTE: 0 K/UL (ref 0–0.2)
BASOPHILS RELATIVE PERCENT: 0.7 %
BUN BLDV-MCNC: 18 MG/DL (ref 7–20)
C-REACTIVE PROTEIN: 6.7 MG/L (ref 0–5.1)
CALCIUM SERPL-MCNC: 9.7 MG/DL (ref 8.3–10.6)
CHLORIDE BLD-SCNC: 102 MMOL/L (ref 99–110)
CO2: 25 MMOL/L (ref 21–32)
CREAT SERPL-MCNC: 1.4 MG/DL (ref 0.8–1.3)
D DIMER: >5250 NG/ML DDU (ref 0–229)
EOSINOPHILS ABSOLUTE: 0.1 K/UL (ref 0–0.6)
EOSINOPHILS RELATIVE PERCENT: 1.8 %
FERRITIN: 226.3 NG/ML (ref 30–400)
FOLATE: 10.7 NG/ML (ref 4.78–24.2)
GFR AFRICAN AMERICAN: >60
GFR NON-AFRICAN AMERICAN: 51
GLUCOSE BLD-MCNC: 179 MG/DL (ref 70–99)
HBA1C MFR BLD: 6.7 %
HCT VFR BLD CALC: 37.5 % (ref 40.5–52.5)
HEMATOLOGY PATH CONSULT: YES
HEMOGLOBIN: 11.9 G/DL (ref 13.5–17.5)
IRON SATURATION: 27 % (ref 20–50)
IRON: 74 UG/DL (ref 59–158)
LYMPHOCYTES ABSOLUTE: 1.1 K/UL (ref 1–5.1)
LYMPHOCYTES RELATIVE PERCENT: 18.7 %
MCH RBC QN AUTO: 21.9 PG (ref 26–34)
MCHC RBC AUTO-ENTMCNC: 31.7 G/DL (ref 31–36)
MCV RBC AUTO: 69.2 FL (ref 80–100)
MONOCYTES ABSOLUTE: 0.4 K/UL (ref 0–1.3)
MONOCYTES RELATIVE PERCENT: 6.3 %
NEUTROPHILS ABSOLUTE: 4.3 K/UL (ref 1.7–7.7)
NEUTROPHILS RELATIVE PERCENT: 72.5 %
OVALOCYTES: ABNORMAL
PDW BLD-RTO: 15.3 % (ref 12.4–15.4)
PLATELET # BLD: 320 K/UL (ref 135–450)
PLATELET SLIDE REVIEW: ADEQUATE
PMV BLD AUTO: 8.9 FL (ref 5–10.5)
POIKILOCYTES: ABNORMAL
POTASSIUM SERPL-SCNC: 4.6 MMOL/L (ref 3.5–5.1)
RBC # BLD: 5.42 M/UL (ref 4.2–5.9)
SEDIMENTATION RATE, ERYTHROCYTE: 15 MM/HR (ref 0–20)
SLIDE REVIEW: ABNORMAL
SODIUM BLD-SCNC: 141 MMOL/L (ref 136–145)
TOTAL IRON BINDING CAPACITY: 277 UG/DL (ref 260–445)
VITAMIN B-12: 420 PG/ML (ref 211–911)
VITAMIN D 25-HYDROXY: 44.3 NG/ML
WBC # BLD: 5.9 K/UL (ref 4–11)

## 2020-12-28 PROCEDURE — 93000 ELECTROCARDIOGRAM COMPLETE: CPT | Performed by: NURSE PRACTITIONER

## 2020-12-28 PROCEDURE — 99214 OFFICE O/P EST MOD 30 MIN: CPT | Performed by: NURSE PRACTITIONER

## 2020-12-28 PROCEDURE — 83036 HEMOGLOBIN GLYCOSYLATED A1C: CPT | Performed by: NURSE PRACTITIONER

## 2020-12-28 RX ORDER — ASPIRIN 81 MG/1
81 TABLET, CHEWABLE ORAL DAILY
COMMUNITY
End: 2021-11-01

## 2020-12-28 RX ORDER — OXYCODONE HYDROCHLORIDE AND ACETAMINOPHEN 5; 325 MG/1; MG/1
1 TABLET ORAL EVERY 6 HOURS PRN
COMMUNITY
End: 2021-03-31

## 2020-12-28 RX ORDER — METFORMIN HYDROCHLORIDE 500 MG/1
TABLET, EXTENDED RELEASE ORAL
Qty: 90 TABLET | Refills: 1 | Status: SHIPPED | OUTPATIENT
Start: 2020-12-28 | End: 2021-10-18 | Stop reason: SDUPTHER

## 2020-12-28 ASSESSMENT — ENCOUNTER SYMPTOMS
CONSTIPATION: 0
BACK PAIN: 0
SINUS PRESSURE: 0
COLOR CHANGE: 0
SHORTNESS OF BREATH: 0
ABDOMINAL PAIN: 0
DIARRHEA: 0
ABDOMINAL DISTENTION: 0
SINUS PAIN: 0
CHEST TIGHTNESS: 0
COUGH: 0
EYE DISCHARGE: 0
NAUSEA: 0

## 2020-12-28 NOTE — PROGRESS NOTES
Date of Service:  2020    Aayush Ly (:  1960) is a 63 y.o. male, here for evaluation of the following medical concerns:    Chief Complaint   Patient presents with    Diabetes     FOLLOW UP FOR DM         HPI     Last A1C 4 months ago was 6.7. Pt on metformin 500mg daily. A1C today is 6.7. Diabetes Mellitus Type 2: Current symptoms/problems include none. Medication compliance:  compliant most of the time  Diabetic diet compliance:  compliant most of the time,    Weight trend: decreasing- down 11 lbs from last visit  Current exercise: currently in physical therapy after left knee replacement  Barriers: none    Home blood sugar records: fasting range: 90-100s  Any episodes of hypoglycemia? no  Eye exam current (within one year): no   reports that he has never smoked. He has never used smokeless tobacco.   Daily Aspirin? Yes    Pt had recent L knee arthroscopy. Denies pain currently. Only takes percocet PRN when going to be \"out and about all day. \" Pt doing PT at home x 3 weeks and now started with physical therapy at [facility] last week. Pt denies shortness of breath, chest pain, heart palpitations, or pain in the legs/calves. Discussed concern with pt with tachycardia, agrees to bloodwork and EKG. Lab Results   Component Value Date    LABA1C 6.7 2020    LABA1C 6.6 2019    LABA1C 7.3 2017     Lab Results   Component Value Date    LABMICR <1.20 2017    CREATININE 1.3 10/26/2020     Lab Results   Component Value Date    ALT 11 10/26/2020    AST 20 10/26/2020     Lab Results   Component Value Date    CHOL 174 10/26/2020    TRIG 88 10/26/2020    HDL 36 (L) 10/26/2020    LDLCALC 120 (H) 10/26/2020          Review of Systems   Constitutional: Negative for activity change, appetite change, fatigue, fever and unexpected weight change. HENT: Negative for congestion, ear pain, sinus pressure and sinus pain. Eyes: Negative for discharge and visual disturbance. Respiratory: Negative for cough, chest tightness and shortness of breath. Cardiovascular: Negative for chest pain, palpitations and leg swelling. Gastrointestinal: Negative for abdominal distention, abdominal pain, constipation, diarrhea and nausea. Endocrine: Negative for cold intolerance, heat intolerance, polydipsia, polyphagia and polyuria. Genitourinary: Negative for decreased urine volume, difficulty urinating, dysuria, flank pain, frequency and urgency. Musculoskeletal: Positive for arthralgias (L knee). Negative for back pain, gait problem, joint swelling, myalgias and neck pain. Skin: Negative for color change, rash and wound. Allergic/Immunologic: Negative for food allergies and immunocompromised state. Neurological: Negative for dizziness, tremors, speech difficulty, weakness, light-headedness, numbness and headaches. Hematological: Negative for adenopathy. Does not bruise/bleed easily. Psychiatric/Behavioral: Negative for confusion, decreased concentration, self-injury, sleep disturbance and suicidal ideas. The patient is not nervous/anxious. Prior to Visit Medications    Medication Sig Taking? Authorizing Provider   aspirin 81 MG chewable tablet Take 81 mg by mouth daily Yes Historical Provider, MD   metFORMIN (GLUCOPHAGE-XR) 500 MG extended release tablet TAKE 1 TABLET BY MOUTH DAILY WITH BREAKFAST Yes BONITA Eli - CNP   oxyCODONE-acetaminophen (PERCOCET) 5-325 MG per tablet Take 1 tablet by mouth every 6 hours as needed for Pain.  Post op Yes Historical Provider, MD   Cholecalciferol (VITAMIN D) 50 MCG (2000 UT) CAPS capsule Take 2,000 Units by mouth daily Yes Historical Provider, MD   Multiple Vitamins-Minerals (THERAPEUTIC MULTIVITAMIN-MINERALS) tablet Take 1 tablet by mouth daily Yes Historical Provider, MD   Lancets MISC Test once daily Yes Justin Glover DO Carotid pulses are 2+ on the right side and 2+ on the left side. Radial pulses are 2+ on the right side and 2+ on the left side. Posterior tibial pulses are 2+ on the right side and 2+ on the left side. Heart sounds: Normal heart sounds, S1 normal and S2 normal. Heart sounds not distant. No murmur. Pulmonary:      Effort: Pulmonary effort is normal.      Breath sounds: Normal breath sounds. Abdominal:      General: Bowel sounds are normal. There is no abdominal bruit. Palpations: Abdomen is soft. Tenderness: There is no abdominal tenderness. Genitourinary:     Comments: Deferred  Musculoskeletal:      Left knee: He exhibits decreased range of motion. Tenderness found. Right lower leg: No edema. Left lower leg: No edema. Lymphadenopathy:      Head:      Right side of head: No submental, submandibular, tonsillar, preauricular, posterior auricular or occipital adenopathy. Left side of head: No submental, submandibular, tonsillar, preauricular, posterior auricular or occipital adenopathy. Cervical: No cervical adenopathy. Right cervical: No superficial, deep or posterior cervical adenopathy. Left cervical: No superficial, deep or posterior cervical adenopathy. Upper Body:      Right upper body: No supraclavicular adenopathy. Left upper body: No supraclavicular adenopathy. Skin:     General: Skin is warm and dry. Capillary Refill: Capillary refill takes less than 2 seconds. Neurological:      General: No focal deficit present. Mental Status: He is alert and oriented to person, place, and time. Mental status is at baseline. Motor: Motor function is intact. No weakness. Coordination: Coordination is intact. Gait: Gait is intact.    Psychiatric:         Attention and Perception: Attention and perception normal.         Mood and Affect: Mood and affect normal.         Speech: Speech normal. Behavior: Behavior normal. Behavior is cooperative. Thought Content: Thought content normal.         Cognition and Memory: Cognition and memory normal.         Judgment: Judgment normal.         ASSESSMENT/PLAN:  1. Type 2 diabetes mellitus without complication, without long-term current use of insulin ([facility])  A1C 6.7 today  Pt would like to be off meds, does not wish to increase metformin but agrees to stay on this  - POCT glycosylated hemoglobin (Hb A1C)  - metFORMIN (GLUCOPHAGE-XR) 500 MG extended release tablet; TAKE 1 TABLET BY MOUTH DAILY WITH BREAKFAST  Dispense: 90 tablet; Refill: 1  Physical activity 150 minutes weekly recommended   Discussed Weight loss, initial goal 10% of body weight  Discussed diabetic diet and not weaning metformin unless pt A1C <5.7 off meds    2. Tachycardia  - EKG 12 Lead: NSR, no evidence of right heart strain  - Vitamin B12 & Folate  - Vitamin D 25 Hydroxy  - CBC Auto Differential  - Ferritin  - Iron and TIBC  - C-Reactive Protein  - Sedimentation Rate  - Basic Metabolic Panel  - D-Dimer, Quantitative    3. S/P left knee arthroscopy  - CBC Auto Differential  - Ferritin  - Iron and TIBC  - C-Reactive Protein  - Sedimentation Rate  - D-Dimer, Quantitative STAT    4.  Iron deficiency anemia, unspecified iron deficiency anemia type  Received iron prior to surgery per pt  - CBC Auto Differential      Care Gaps Addressed  Recommended eye exam annually  Call insurance company to discuss coverage for shingles vaccine (Shingrix) 2 dose series

## 2020-12-28 NOTE — PATIENT INSTRUCTIONS
Go to [email or web address] and sign in to your [employer] account. Enter [phone] in the [facility] box to learn more about \"Iron-Rich Diet: Care Instructions. \"     If you do not have an account, please click on the \"Sign Up Now\" link. Current as of: August 22, 2019               Content Version: 12.6  © 8538-1064 [employer]. Care instructions adapted under license by [facility] ([facility]). If you have questions about a medical condition or this instruction, always ask your healthcare professional. [place] [address] any warranty or liability for your use of this information.

## 2020-12-29 ENCOUNTER — HOSPITAL ENCOUNTER (OUTPATIENT)
Dept: PHYSICAL THERAPY | Age: 60
Setting detail: THERAPIES SERIES
Discharge: HOME | End: 2020-12-29
Payer: COMMERCIAL

## 2020-12-29 LAB — HEMATOLOGY PATH CONSULT: NORMAL

## 2020-12-29 PROCEDURE — 97110 THERAPEUTIC EXERCISES: CPT

## 2020-12-29 NOTE — PROGRESS NOTES
Physical Therapy  Daily Treatment Note  Date: 2020  Patient Name: Hermes Molina  MRN: [id number]     :   1960    Subjective:   General  Chart Reviewed: Yes  Additional Pertinent Hx: Pt here for rehab following L TKA 2020. Referring Practitioner: Dr Barbara Maurer  PT Visit Information  Onset Date: 20  PT Insurance Information: BCBS  Total # of Visits to Date: 1  Subjective  Subjective: Pt reports mild pain at rest.  C/o increased pain with activity. Treatment Activities:      PROM LLE (degrees)  LLE PROM: Exceptions  L Knee Flexion 0-145: 87  AROM LLE (degrees)  LLE AROM : Exceptions  L Knee Flexion 0-145: 77  L Knee Extension 0: -12       Exercises  Exercise 1: Nustep level 5 x 3 min. , seat at 13, cues to maximize L knee flex. Exercise 2: Ankle DF stretch x 2 min. Exercise 3: On step: L knee Flex stretch 5 x 30 s. Exercise 4: On step: L knee Ext stretch 5 x 30 s. Exercise 5: LLE quad set 10 x 10 s. hold  Exercise 6: Seated: L knee LAQ 3 x 10 (1 lb. ankle weight). Exercise 7: Seated: L knee Flex AROM 2 x 10 with orange t-band resistance. Assessment:   Conditions Requiring Skilled Therapeutic Intervention  Body structures, Functions, Activity limitations: Decreased functional mobility ; Decreased ROM; Decreased strength;Decreased balance; Increased pain  Assessment: Pt able to achieve slightly improved L knee flexion AROM/PROM today. Knee Ext was somewhat limited. He would benefit from continued therapy to further improve knee strength/ROM, and to improve gait mechanics. Treatment Diagnosis: Gait and mobility dysfunction s/p left TKA  Decision Making: Low Complexity  REQUIRES PT FOLLOW UP: Yes  Discharge Recommendations: 2-3 sessions per week  Safety Devices  Type of devices: All fall risk precautions in place;Gait belt     Goals:  Short term goals  Time Frame for Short term goals:  To be achieved in 2 weeks Short term goal 1: Independent in HEP and progression per patient tolerance, in order to prevent re-injury. Long term goals  Time Frame for Long term goals : To be achieved in 6 weeks  Long term goal 1: Disability index score of 44 or better for the LEFS to assist with reaching prior level of function. Long term goal 2: Patient will demonstrate increased AROM to 3-110 to allow for proper joint functioning as indicated by patients Functional Deficits. Long term goal 3: Patient will demonstrate an increase in Strength to 4/5  proximal hip strength and knee  Control in LE to allow for proper functional mobility as indicated by patients Functional Deficits. Long term goal 4: Patient will return to normal gait pattern at a community level  Patient Goals   Patient goals : To improve L knee strength/ROM    Plan:    Plan  Times per week: 2-3x  Specific instructions for Next Treatment: Improve L knee Flex/Ext strength/ROM  Current Treatment Recommendations: Strengthening, ROM, Balance Training, Gait Training, Endurance Training, Stair training, Positioning, Modalities, Equipment Evaluation, Education, & procurement, Patient/Caregiver Education & Training, Safety Education & Training, Home Exercise Program, Neuromuscular Re-education, Manual Therapy - Soft Tissue Mobilization, Manual Therapy - Joint Manipulation, Pain Management  Safety Devices  Type of devices:  All fall risk precautions in place, Gait belt  Timed Code Treatment Minutes: 30 Minutes     Therapy Time   Individual Concurrent Group Co-treatment   Time In 0907         Time Out 0945         Minutes 38         Timed Code Treatment Minutes: 30 Minutes   Therapeutic Exercise x 1 charge  Therapeutic Activity x 1 charge    April Andre PT    Electronically signed by April Andre, PT [id number] on 12/29/2020 at 11:07 AM

## 2020-12-31 ENCOUNTER — HOSPITAL ENCOUNTER (OUTPATIENT)
Dept: PHYSICAL THERAPY | Age: 60
Setting detail: THERAPIES SERIES
Discharge: HOME | End: 2020-12-31
Payer: COMMERCIAL

## 2020-12-31 PROCEDURE — 97110 THERAPEUTIC EXERCISES: CPT

## 2020-12-31 NOTE — PROGRESS NOTES
Physical Therapy  Daily Treatment Note  Date: 2020  Patient Name: Edel Brar  MRN: [id number]     :   1960    Subjective:   General  Chart Reviewed: Yes  Additional Pertinent Hx: Pt here for rehab following L TKA 2020. Response To Previous Treatment: Not applicable  Referring Practitioner: Dr Justin Roque  PT Visit Information  Onset Date: 20  PT Insurance Information: BCBS  Total # of Visits to Date: 2  Subjective  Subjective: Pt reports mild pain at rest.  C/o increased pain with activity. Treatment Activities:      Manual therapy  PROM: L knee Flex stretch x 5 min. Ext stretch x 5 min. PROM LLE (degrees)  L Knee Flexion 0-145: 92  L Knee Extension 0: -7  AROM LLE (degrees)  L Knee Flexion 0-145: 85  L Knee Extension 0: -10    Exercises  Exercise 1: Nustep level 5 x 4 min. , seat at 13, cues to maximize L knee flex. Exercise 2: Ankle DF stretch x 4 min. Exercise 3: On step: L knee Flex stretch 5 x 30 s. Exercise 5: LLE quad set 10 x 10 s. hold  Exercise 6: Seated: L knee LAQ  x 20 (1 lb. ankle weight). , x 20 3 lb. ankle weight. Exercise 7: Seated: L knee Flex AROM  2 x 10 with purple t-band resistance. Other Activities  Other Activities: Other (see comment)  Comment: PT educated pt on additional HEP (knee flex with blue t-band). PT also encouraged pt to avoid heavy activity prior to next session in order to maximize his tolerance for stretching/exercise in therapy. Assessment:   Conditions Requiring Skilled Therapeutic Intervention  Body structures, Functions, Activity limitations: Decreased functional mobility ; Decreased ROM; Decreased strength;Decreased balance; Increased pain  Assessment: Pt's knee flex and ext ROM were both mildly improved today. 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 Pt would benefit from continued therapy to futher improve strength, ROM, balance, and independence ambulating with LRAD. Decision Making: Low Complexity  REQUIRES PT FOLLOW UP: Yes  Discharge Recommendations: 2-3 sessions per week  Safety Devices  Type of devices: All fall risk precautions in place;Gait belt     Goals:  Short term goals  Time Frame for Short term goals: To be achieved in 2 weeks  Short term goal 1: Independent in HEP and progression per patient tolerance, in order to prevent re-injury. Long term goals  Time Frame for Long term goals : To be achieved in 6 weeks  Long term goal 1: Disability index score of 44 or better for the LEFS to assist with reaching prior level of function. Long term goal 2: Patient will demonstrate increased AROM to 3-110 to allow for proper joint functioning as indicated by patients Functional Deficits. Long term goal 3: Patient will demonstrate an increase in Strength to 4/5  proximal hip strength and knee  Control in LE to allow for proper functional mobility as indicated by patients Functional Deficits. Long term goal 4: Patient will return to normal gait pattern at a community level  Patient Goals   Patient goals : To improve L knee strength/ROM    Plan:    Plan  Times per week: 2-3x  Specific instructions for Next Treatment: Improve L knee Flex/Ext strength/ROM  Current Treatment Recommendations: Strengthening, ROM, Balance Training, Gait Training, Endurance Training, Stair training, Positioning, Modalities, Equipment Evaluation, Education, & procurement, Patient/Caregiver Education & Training, Safety Education & Training, Home Exercise Program, Neuromuscular Re-education, Manual Therapy - Soft Tissue Mobilization, Manual Therapy - Joint Manipulation, Pain Management  Safety Devices  Type of devices:  All fall risk precautions in place, Gait belt  Restraints  Initially in place: No  Timed Code Treatment Minutes: 40 Minutes     Therapy Time   Individual Concurrent Group Co-treatment   Time In 0900         Time Out 0945         Minutes 45         Timed Code Treatment Minutes: 40 Minutes Therapeutic Exercise x 3 charges    Tiki Simmons PT    Electronically signed by Tiki Simmons, PT [id number] on 12/31/2020 at 11:41 AM

## 2021-01-04 ENCOUNTER — HOSPITAL ENCOUNTER (OUTPATIENT)
Dept: PHYSICAL THERAPY | Age: 61
Setting detail: THERAPIES SERIES
End: 2021-01-04
Payer: COMMERCIAL

## 2021-01-04 NOTE — FLOWSHEET NOTE
Physical Therapy  Cancellation/No-show Note  Patient Name:  Samia Roa  :  1960   Date:  2021  Cancelled visits to date: 0  No-shows to date: 1    For today's appointment patient:  []  Cancelled  []  Rescheduled appointment  [x]  No-show     Reason given by patient:  []  Patient ill  []  Conflicting appointment  []  No transportation    []  Conflict with work  []  No reason given  [x]  Other:     Comments:  Called patient phone was disconnected.     Electronically signed by:  Gwyn Nicolas PTA

## 2021-01-05 ENCOUNTER — HOSPITAL ENCOUNTER (OUTPATIENT)
Dept: PHYSICAL THERAPY | Age: 61
Setting detail: THERAPIES SERIES
Discharge: HOME | End: 2021-01-05
Payer: COMMERCIAL

## 2021-01-05 PROCEDURE — 97110 THERAPEUTIC EXERCISES: CPT

## 2021-01-05 NOTE — PROGRESS NOTES
Physical Therapy  Daily Treatment Note  Date: 2021  Patient Name: Grant Huber  MRN: [id number]     :   1960    Subjective:   General  Chart Reviewed: Yes  Additional Pertinent Hx: Pt here for rehab following L TKA 2020. Response To Previous Treatment: Not applicable  Referring Practitioner: Dr Jamie Murphy  PT Visit Information  Onset Date: 20  PT Insurance Information: BCBS  Total # of Visits to Date: 3  Subjective  Subjective: Pt reports moderate pain this morning (4/10). States he was very active over the weekend (in the process of flipping a house). Treatment Activities:      Manual therapy  PROM: L knee Flex stretch x 5 min. Ext stretch x 3 min. L ankle DF stretch 2 x 2 min. Ambulation  Ambulation?: Yes  Ambulation 1  Device: Single point cane  Assistance: Modified Independent  Quality of Gait: PT cued pt to decrease step length and maximize L knee ext and attempt to achieve more erect posture. Pt moved slowly, doing well to maintain balance throughout. Mechanics mildly improved. Distance: 100'     PROM LLE (degrees)  LLE PROM: Exceptions  L Knee Flexion 0-145: 94  L Knee Extension 0: -10  AROM LLE (degrees)  L Knee Flexion 0-145: 88  L Knee Extension 0: -13     Exercises  Exercise 1: Nustep level 5 x 4 min. , seat at 13, cues to maximize L knee flex. Exercise 2: Mini-squat 2 x 10 with light UE support. Exercise 3: Lateral weight shift 2 x 10, with light UE support, cues for technique. Exercise 4: On step: L knee Ext stretch 4 x 30 s. Exercise 5: LLE quad set 10 x 5 s. hold  Exercise 6: on step: L knee Flex xtretch 4 x 30 s. hold. Exercise 7: PT provided pt with written HEP. Assessment:   Conditions Requiring Skilled Therapeutic Intervention  Body structures, Functions, Activity limitations: Decreased functional mobility ; Decreased ROM; Decreased strength;Decreased balance; Increased pain Assessment: Pt's knee flex ROM continues to improve. Ext was more limited today. PT provided pt with additional calf stretching exercises, and cued pt for improved gait mechanics. He would benefit from continued therapy to further improve strength, balance, ROM, and independence ambulating. Treatment Diagnosis: Gait and mobility dysfunction s/p left TKA  Decision Making: Low Complexity  REQUIRES PT FOLLOW UP: Yes  Discharge Recommendations: 2-3 sessions per week  Safety Devices  Type of devices: All fall risk precautions in place;Gait belt     Goals:  Short term goals  Time Frame for Short term goals: To be achieved in 2 weeks  Short term goal 1: Independent in HEP and progression per patient tolerance, in order to prevent re-injury. Long term goals  Time Frame for Long term goals : To be achieved in 6 weeks  Long term goal 1: Disability index score of 44 or better for the LEFS to assist with reaching prior level of function. Long term goal 2: Patient will demonstrate increased AROM to 3-110 to allow for proper joint functioning as indicated by patients Functional Deficits. Long term goal 3: Patient will demonstrate an increase in Strength to 4/5  proximal hip strength and knee  Control in LE to allow for proper functional mobility as indicated by patients Functional Deficits. Long term goal 4: Patient will return to normal gait pattern at a community level  Patient Goals   Patient goals :  To improve L knee strength/ROM    Plan:    Plan  Times per week: 2-3x  Specific instructions for Next Treatment: Improve L knee Flex/Ext strength/ROM Current Treatment Recommendations: Strengthening, ROM, Balance Training, Gait Training, Endurance Training, Stair training, Positioning, Modalities, Equipment Evaluation, Education, & procurement, Patient/Caregiver Education & Training, Safety Education & Training, Home Exercise Program, Neuromuscular Re-education, Manual Therapy - Soft Tissue Mobilization, Manual Therapy - Joint Manipulation, Pain Management  Safety Devices  Type of devices:  All fall risk precautions in place, Gait belt  Restraints  Initially in place: No  Timed Code Treatment Minutes: 40 Minutes     Therapy Time   Individual Concurrent Group Co-treatment   Time In 0900         Time Out 0945         Minutes 45         Timed Code Treatment Minutes: 40 Minutes   Therapeutic Exercise x 3 charges    Ana Norris PT    Electronically signed by Ana Norris, ARACELI [id number] on 1/5/2021 at 11:31 AM

## 2021-01-06 ENCOUNTER — APPOINTMENT (OUTPATIENT)
Dept: PHYSICAL THERAPY | Age: 61
End: 2021-01-06
Payer: COMMERCIAL

## 2021-01-07 ENCOUNTER — HOSPITAL ENCOUNTER (OUTPATIENT)
Dept: PHYSICAL THERAPY | Age: 61
Setting detail: THERAPIES SERIES
Discharge: HOME | End: 2021-01-07
Payer: COMMERCIAL

## 2021-01-07 PROCEDURE — 97110 THERAPEUTIC EXERCISES: CPT

## 2021-01-07 NOTE — PROGRESS NOTES
Physical Therapy  Daily Treatment Note  Date: 2021  Patient Name: Cesilia Miles  MRN: [id number]     :   1960    Subjective:   General  Chart Reviewed: Yes  Additional Pertinent Hx: Pt here for rehab following L TKA 2020. Response To Previous Treatment: Patient with no complaints from previous session. Referring Practitioner: Dr Jenniffer Allison  PT Visit Information  Onset Date: 20  PT Insurance Information: BCBS  Total # of Visits to Date: 4  Subjective  Subjective: Pt reports moderate pain this morning (3/10). C/o stiffness this morning. Treatment Activities:   Manual therapy  PROM: L knee Flex stretch x 5 min. Ext stretch x 3 min. PROM LLE (degrees)  L Knee Flexion 0-145: 95  L Knee Extension 0: -10  AROM LLE (degrees)  L Knee Flexion 0-145: 89  L Knee Extension 0: -12     Exercises  Exercise 1: Nustep level 5 x 4 min. , seat at 13, cues to maximize L knee Ext. Exercise 2: Mini-squat 2 x 10 without UE support. Exercise 3: On wedge: (B) calf stretch x 3 min. Exercise 4: On step: L knee Ext stretch 4 x 30 s. Exercise 5: LLE quad set 10 x 5 s. hold  Exercise 6: LLE SL press x 20 at 50 lbs. , x 20 at 60 lbs. , cues to maximize extension. Exercise 7: Ambulation 48' without device, cues to maximize L knee extension at heel strike. Pt demonstrated improved posture, improved stability. Exercise 8: Unsupported RLE march 2 x 10, cues to maximize L quad contraction. Other Activities  Other Activities: Other (see comment)  Comment: PT educated pt on additional HEP (RLE march while maintaining L quad contraction in stance, short-distance ambulation without device while focusing on posture). PT also encouraged pt to avoid heavy activity prior to next session in order to maximize his tolerance for stretching/exercise in therapy.     Assessment:   Conditions Requiring Skilled Therapeutic Intervention Current Treatment Recommendations: Strengthening, ROM, Balance Training, Gait Training, Endurance Training, Stair training, Positioning, Modalities, Equipment Evaluation, Education, & procurement, Patient/Caregiver Education & Training, Safety Education & Training, Home Exercise Program, Neuromuscular Re-education, Manual Therapy - Soft Tissue Mobilization, Manual Therapy - Joint Manipulation, Pain Management  Safety Devices  Type of devices:  All fall risk precautions in place, Gait belt  Restraints  Initially in place: No  Timed Code Treatment Minutes: 40 Minutes     Therapy Time   Individual Concurrent Group Co-treatment   Time In 0900         Time Out 0945         Minutes 45         Timed Code Treatment Minutes: 40 Minutes   Therapeutic Exercise x 3 charges    Flakita Cooley, PT    Electronically signed by Flakita Cooley, PT [id number] on 1/7/2021 at 11:48 AM

## 2021-01-08 ENCOUNTER — APPOINTMENT (OUTPATIENT)
Dept: PHYSICAL THERAPY | Age: 61
End: 2021-01-08
Payer: COMMERCIAL

## 2021-01-11 ENCOUNTER — APPOINTMENT (OUTPATIENT)
Dept: PHYSICAL THERAPY | Age: 61
End: 2021-01-11
Payer: COMMERCIAL

## 2021-01-12 ENCOUNTER — HOSPITAL ENCOUNTER (OUTPATIENT)
Dept: PHYSICAL THERAPY | Age: 61
Setting detail: THERAPIES SERIES
Discharge: HOME | End: 2021-01-12
Payer: COMMERCIAL

## 2021-01-12 PROCEDURE — 97110 THERAPEUTIC EXERCISES: CPT

## 2021-01-12 NOTE — PROGRESS NOTES
Therapeutic Exercise x 3 charges    Mango Mondragon PT    Electronically signed by Mango Mondragon, ARACELI [id number] on 1/12/2021 at 11:25 AM

## 2021-01-13 ENCOUNTER — APPOINTMENT (OUTPATIENT)
Dept: PHYSICAL THERAPY | Age: 61
End: 2021-01-13
Payer: COMMERCIAL

## 2021-01-14 ENCOUNTER — HOSPITAL ENCOUNTER (OUTPATIENT)
Dept: PHYSICAL THERAPY | Age: 61
Setting detail: THERAPIES SERIES
Discharge: HOME | End: 2021-01-14
Payer: COMMERCIAL

## 2021-01-14 PROCEDURE — 97110 THERAPEUTIC EXERCISES: CPT

## 2021-01-14 NOTE — PROGRESS NOTES
Physical Therapy  Daily Treatment Note  Date: 2021  Patient Name: Edel Brar  MRN: [id number]     :   1960    Subjective:   General  Chart Reviewed: Yes  Additional Pertinent Hx: Pt here for rehab following L TKA 2020. Response To Previous Treatment: Patient with no complaints from previous session. Referring Practitioner: Dr Justin Roque  PT Visit Information  Onset Date: 20  PT Insurance Information: BCBS  Total # of Visits to Date: 6  Subjective  Subjective: Pt reports minimal pain. Returns to MD tomorrow for f/u. Treatment Activities:      Manual therapy  PROM: (B) ankle DF stretch x 3 min. R Knee Ext stretch x 5 min. R knee Flex stretch x 3 min. Bed mobility  Bridging: Independent  Supine to Sit: Independent  Sit to Supine: Independent     Transfers  Sit to Stand: Modified independent  Stand to sit: Modified independent     PROM LLE (degrees)  LLE PROM: Exceptions  L Knee Flexion 0-145: 101  L Knee Extension 0: -7  AROM LLE (degrees)  LLE AROM : Exceptions  L Knee Flexion 0-145: 95  L Knee Extension 0: -11     Exercises  Exercise 1: Nustep level 4 x 5 min. , seat at 13, cues to maximize L knee Ext. Exercise 2: Quad set with stretch strap, LLE x 15, cues to sustain quad contraction, Mod overpressure from PT. Exercise 3: L knee AROM with swiss ball x 3 min. Jamison Moralesk Exercise 4: Standing:  L quad set while maintaining lunge position (RLE forward). Exercise 5: Standing with LUE support: Trunk flex x 10 while maintaining LLE SLS with quad set, cues to maximize quad contraction. Exercise 6: Mini-squats x 20 with light UE support. Exercise 7: (B) Heel Raise x 20, light UE support. Exercise 8: Ambulation in christine without cane, 200', cues to maximize L knee Ext.      Assessment:   Conditions Requiring Skilled Therapeutic Intervention Body structures, Functions, Activity limitations: Decreased functional mobility ; Decreased ROM; Decreased strength;Decreased balance; Increased pain  Assessment: PT provided pt with additional HEP for knee Ext exercises, and encouraged intermittent ambulation without device at home to practice improved posture and gait mechanics (Knee ext on heel strike). Knee Ext PROM was mildly improved today, and pt tolerated increased stretch time. He would benefit from continued therapy to maximize knee ROM, strength, balance, and independence transferring/ambulating. Treatment Diagnosis: Gait and mobility dysfunction s/p left TKA  Decision Making: Low Complexity  Discharge Recommendations: 2-3 sessions per week  Safety Devices  Type of devices: All fall risk precautions in place;Gait belt     Goals:  Short term goals  Time Frame for Short term goals: To be achieved in 2 weeks  Short term goal 1: Independent in HEP and progression per patient tolerance, in order to prevent re-injury. - met 1/7  Long term goals  Time Frame for Long term goals : To be achieved in 6 weeks  Long term goal 1: Disability index score of 44 or better for the LEFS to assist with reaching prior level of function. Long term goal 2: Patient will demonstrate increased AROM to 3-110 to allow for proper joint functioning as indicated by patients Functional Deficits. Long term goal 3: Patient will demonstrate an increase in Strength to 4/5  proximal hip strength and knee  Control in LE to allow for proper functional mobility as indicated by patients Functional Deficits. Long term goal 4: Patient will return to normal gait pattern at a community level  Patient Goals   Patient goals :  To improve L knee strength/ROM    Plan:    Plan  Times per week: 2-3x  Specific instructions for Next Treatment: Improve L knee Flex/Ext strength/ROM Current Treatment Recommendations: Strengthening, ROM, Balance Training, Gait Training, Endurance Training, Stair training, Positioning, Modalities, Equipment Evaluation, Education, & procurement, Patient/Caregiver Education & Training, Safety Education & Training, Home Exercise Program, Neuromuscular Re-education, Manual Therapy - Soft Tissue Mobilization, Manual Therapy - Joint Manipulation, Pain Management  Safety Devices  Type of devices:  All fall risk precautions in place, Gait belt  Restraints  Initially in place: No  Timed Code Treatment Minutes: 40 Minutes     Therapy Time   Individual Concurrent Group Co-treatment   Time In 0900         Time Out 0945         Minutes 45         Timed Code Treatment Minutes: 40 Minutes   Therapeutic Exercise x 3 charges    Desiree Duverney, PT    Electronically signed by Desiree Duverney, PT [id number] on 1/14/2021 at 4:22 PM

## 2021-01-15 ENCOUNTER — APPOINTMENT (OUTPATIENT)
Dept: PHYSICAL THERAPY | Age: 61
End: 2021-01-15
Payer: COMMERCIAL

## 2021-01-18 ENCOUNTER — APPOINTMENT (OUTPATIENT)
Dept: PHYSICAL THERAPY | Age: 61
End: 2021-01-18
Payer: COMMERCIAL

## 2021-01-19 ENCOUNTER — HOSPITAL ENCOUNTER (OUTPATIENT)
Dept: PHYSICAL THERAPY | Age: 61
Setting detail: THERAPIES SERIES
Discharge: HOME | End: 2021-01-19
Payer: COMMERCIAL

## 2021-01-19 PROCEDURE — 97110 THERAPEUTIC EXERCISES: CPT

## 2021-01-19 PROCEDURE — 97140 MANUAL THERAPY 1/> REGIONS: CPT

## 2021-01-19 NOTE — PROGRESS NOTES
Physical Therapy  Daily Treatment Note  Date: 2021  Patient Name: Ho Cade  MRN: [id number]     :   1960    Subjective:   General  Chart Reviewed: Yes  Additional Pertinent Hx: Pt here for rehab following L TKA 2020. Response To Previous Treatment: Patient with no complaints from previous session. Referring Practitioner: Dr Beto Hopson  PT Visit Information  Onset Date: 20  PT Insurance Information: [facility]  Total # of Visits to Date: 7  Subjective  Subjective: Pt agreeable to session. Reports f/u with MD went well. Treatment Activities:      Manual therapy  PROM: (B) ankle DF stretch x 3 min. R Knee Ext stretch x 3 min. R knee Flex stretch x 3 min. Soft tissue mobilization to L gastroc and medial hamstring x 10  Min. PROM LLE (degrees)  L Knee Flexion 0-145: 104  L Knee Extension 0: -7  AROM LLE (degrees)  L Knee Flexion 0-145: 100  L Knee Extension 0: -9     Exercises  Exercise 1: Nustep level 4 x 5 min. , seat at 13, cues to maximize L knee Ext. Exercise 2: Quad set with stretch strap, LLE x 15, cues to sustain quad contraction, Mod overpressure from PT. Exercise 3: L knee AROM with swiss ball x 3 min. Adonna Dayhoff Exercise 4: L knee Flex stretch on step x 3 min. Exercise 5: L knee Ext stretch on step x 2 min. Assessment:   Conditions Requiring Skilled Therapeutic Intervention  Body structures, Functions, Activity limitations: Decreased functional mobility ; Decreased ROM; Decreased strength;Decreased balance; Increased pain  Assessment: Pt able to achieve improved knee Ext AROM and PROM today. Reports f/u with MD went well. He would benefit from continued therapy to further improve knee strenth, ROM, balance, and independence ambulating.   Treatment Diagnosis: Gait and mobility dysfunction s/p left TKA  Decision Making: Low Complexity  REQUIRES PT FOLLOW UP: Yes  Discharge Recommendations: 2-3 sessions per week  Safety Devices Type of devices: All fall risk precautions in place;Gait belt        Goals:  Short term goals  Time Frame for Short term goals: To be achieved in 2 weeks  Short term goal 1: Independent in HEP and progression per patient tolerance, in order to prevent re-injury. - met 1/7  Long term goals  Time Frame for Long term goals : To be achieved in 6 weeks  Long term goal 1: Disability index score of 44 or better for the LEFS to assist with reaching prior level of function. Long term goal 2: Patient will demonstrate increased AROM to 3-110 to allow for proper joint functioning as indicated by patients Functional Deficits. Long term goal 3: Patient will demonstrate an increase in Strength to 4/5  proximal hip strength and knee  Control in LE to allow for proper functional mobility as indicated by patients Functional Deficits. Long term goal 4: Patient will return to normal gait pattern at a community level  Patient Goals   Patient goals : To improve L knee strength/ROM    Plan:    Plan  Times per week: 2-3x  Specific instructions for Next Treatment: Improve L knee Flex/Ext strength/ROM  Current Treatment Recommendations: Strengthening, ROM, Balance Training, Gait Training, Endurance Training, Stair training, Positioning, Modalities, Equipment Evaluation, Education, & procurement, Patient/Caregiver Education & Training, Safety Education & Training, Home Exercise Program, Neuromuscular Re-education, Manual Therapy - Soft Tissue Mobilization, Manual Therapy - Joint Manipulation, Pain Management  Safety Devices  Type of devices:  All fall risk precautions in place, Gait belt  Restraints  Initially in place: No  Timed Code Treatment Minutes: 40 Minutes     Therapy Time   Individual Concurrent Group Co-treatment   Time In 0900         Time Out 0945         Minutes 45         Timed Code Treatment Minutes: 40 Minutes   manual therapy x 1 charge  Therapeutic Exercise x 2 charges    Kian Darnell PT Electronically signed by Anna Kaur PT on 1/19/2021 at 9:56 AM

## 2021-01-20 ENCOUNTER — APPOINTMENT (OUTPATIENT)
Dept: PHYSICAL THERAPY | Age: 61
End: 2021-01-20
Payer: COMMERCIAL

## 2021-01-21 ENCOUNTER — HOSPITAL ENCOUNTER (OUTPATIENT)
Dept: PHYSICAL THERAPY | Age: 61
Setting detail: THERAPIES SERIES
Discharge: HOME | End: 2021-01-21
Payer: COMMERCIAL

## 2021-01-21 PROCEDURE — 97110 THERAPEUTIC EXERCISES: CPT

## 2021-01-21 PROCEDURE — 97140 MANUAL THERAPY 1/> REGIONS: CPT

## 2021-01-21 NOTE — PROGRESS NOTES
Physical Therapy  Daily Treatment Note  Date: 2021  Patient Name: Shola Rodrigues  MRN: [id number]     :   1960    Subjective:   General  Chart Reviewed: Yes  Additional Pertinent Hx: Pt here for rehab following L TKA 2020. Response To Previous Treatment: Patient with no complaints from previous session. Referring Practitioner: Dr Hannah Marsh  PT Visit Information  Onset Date: 20  PT Insurance Information: [facility]  Total # of Visits to Date: 8  Subjective  Subjective: Pt agreeable to session. Denies significant pain at rest.          Treatment Activities:      Manual therapy  PROM: L rectus stretch (prone) x 4 min. L piriformis stretch x 3 min. Soft Tissue Mobalization: STM to L gastroc, soleus, and hamstring x 10 min. Exercises  Exercise 1: Nustep level 4 x 4 min. , seat at 13, cues to maximize L knee Ext. Exercise 2: LLE quad set x 10. Exercise 3: (B) ankle DF stretch x 2 min. Exercise 4: L knee Flex stretch on step x 3 min. Exercise 5: L knee Ext stretch on step x 3 min. Exercise 6: PT provided pt with written HEP for piriformis, and rectus stretch. Assessment:   Conditions Requiring Skilled Therapeutic Intervention  Body structures, Functions, Activity limitations: Decreased functional mobility ; Decreased ROM; Decreased strength;Decreased balance; Increased pain  Assessment: PT provided pt with additional HEP exercises (prone rectus stretch, piriformis stretch). Pt tolerated STM to gastroc, soleus, and hamstring. Ext ROM remains limited. He would benefit from continued therapy to maximize his knee strength, and ROM, improve balance, and improve gait mechanics. Treatment Diagnosis: Gait and mobility dysfunction s/p left TKA  Decision Making: Low Complexity  Discharge Recommendations: 2-3 sessions per week  Safety Devices  Type of devices: All fall risk precautions in place;Gait belt     Goals:  Short term goals  Time Frame for Short term goals:  To be achieved in 2 weeks Short term goal 1: Independent in HEP and progression per patient tolerance, in order to prevent re-injury. - met 1/7  Long term goals  Time Frame for Long term goals : To be achieved in 6 weeks  Long term goal 1: Disability index score of 44 or better for the LEFS to assist with reaching prior level of function. Long term goal 2: Patient will demonstrate increased AROM to 3-110 to allow for proper joint functioning as indicated by patients Functional Deficits. Long term goal 3: Patient will demonstrate an increase in Strength to 4/5  proximal hip strength and knee  Control in LE to allow for proper functional mobility as indicated by patients Functional Deficits. Long term goal 4: Patient will return to normal gait pattern at a community level  Patient Goals   Patient goals : To improve L knee strength/ROM    Plan:    Plan  Times per week: 2-3x  Specific instructions for Next Treatment: Improve L knee Flex/Ext strength/ROM  Current Treatment Recommendations: Strengthening, ROM, Balance Training, Gait Training, Endurance Training, Stair training, Positioning, Modalities, Equipment Evaluation, Education, & procurement, Patient/Caregiver Education & Training, Safety Education & Training, Home Exercise Program, Neuromuscular Re-education, Manual Therapy - Soft Tissue Mobilization, Manual Therapy - Joint Manipulation, Pain Management  Safety Devices  Type of devices:  All fall risk precautions in place, Gait belt  Restraints  Initially in place: No  Timed Code Treatment Minutes: 40 Minutes     Therapy Time   Individual Concurrent Group Co-treatment   Time In 0900         Time Out 0945         Minutes 45         Timed Code Treatment Minutes: 40 Minutes   Therapeutic Exercise x 2 charges  Manual therapy x 1 charge    Taurus Hooper PT    Electronically signed by Taurus Hooper PT [id number] on 1/21/2021 at 10:57 AM

## 2021-01-22 ENCOUNTER — APPOINTMENT (OUTPATIENT)
Dept: PHYSICAL THERAPY | Age: 61
End: 2021-01-22
Payer: COMMERCIAL

## 2021-01-25 ENCOUNTER — APPOINTMENT (OUTPATIENT)
Dept: PHYSICAL THERAPY | Age: 61
End: 2021-01-25
Payer: COMMERCIAL

## 2021-01-26 ENCOUNTER — HOSPITAL ENCOUNTER (OUTPATIENT)
Dept: PHYSICAL THERAPY | Age: 61
Setting detail: THERAPIES SERIES
Discharge: HOME | End: 2021-01-26
Payer: COMMERCIAL

## 2021-01-26 PROCEDURE — 97110 THERAPEUTIC EXERCISES: CPT

## 2021-01-26 NOTE — PROGRESS NOTES
Physical Therapy  Daily Treatment Note  Date: 2021  Patient Name: Miky Krause  MRN: [id number]     :   1960    Subjective:   General  Chart Reviewed: Yes  Additional Pertinent Hx: Pt here for rehab following L TKA 2020. Response To Previous Treatment: Patient with no complaints from previous session. Referring Practitioner: Dr Renetta Amato  PT Visit Information  Onset Date: 20  PT Insurance Information: BCBS  Total # of Visits to Date: 9  Subjective  Subjective: Pt c/o increased pain in L hip after last session. Very stiff today. Treatment Activities:   Manual therapy  PROM: L rectus stretch (prone) x 4 min. L piriformis stretch x 2 min. L HS stretch x 1 min. L knee Ext stretch x 1 min. PROM LLE (degrees)  L Knee Flexion 0-145: 104  L Knee Extension 0: -10  AROM LLE (degrees)  L Knee Flexion 0-145: 94  L Knee Extension 0: -14     Exercises  Exercise 1: Nustep level 4 x 3 min. , seat at 13, cues to maximize L knee Ext. Exercise 2: LLE quad set 5 s. hold x 10 with max overpressure from PT. Exercise 3: (B) ankle DF stretch x 2 min. Exercise 4: L knee Ext stretch on step x 2 min. Assessment:   Conditions Requiring Skilled Therapeutic Intervention  Body structures, Functions, Activity limitations: Decreased functional mobility ; Decreased ROM; Decreased strength;Decreased balance; Increased pain  Assessment: Pt more limited by pain and stiffness today, reporting significant L hip pain (rectus insertion), and difficulty flexing/extending knee. He did not tolerate stretching very well. He would benefit from continued therapy to reduce pain, improve ROM, and improve gait mechanics. Treatment Diagnosis: Gait and mobility dysfunction s/p left TKA  Decision Making: Low Complexity  REQUIRES PT FOLLOW UP: Yes  Discharge Recommendations: 2-3 sessions per week  Safety Devices  Type of devices:  All fall risk precautions in place;Gait belt     Goals:  Short term goals Time Frame for Short term goals: To be achieved in 2 weeks  Short term goal 1: Independent in HEP and progression per patient tolerance, in order to prevent re-injury. - met 1/7  Long term goals  Time Frame for Long term goals : To be achieved in 6 weeks  Long term goal 1: Disability index score of 44 or better for the LEFS to assist with reaching prior level of function. Long term goal 2: Patient will demonstrate increased AROM to 3-110 to allow for proper joint functioning as indicated by patients Functional Deficits. Long term goal 3: Patient will demonstrate an increase in Strength to 4/5  proximal hip strength and knee  Control in LE to allow for proper functional mobility as indicated by patients Functional Deficits. Long term goal 4: Patient will return to normal gait pattern at a community level  Patient Goals   Patient goals : To improve L knee strength/ROM    Plan:    Plan  Times per week: 2-3x  Specific instructions for Next Treatment: Improve L knee Flex/Ext strength/ROM  Current Treatment Recommendations: Strengthening, ROM, Balance Training, Gait Training, Endurance Training, Stair training, Positioning, Modalities, Equipment Evaluation, Education, & procurement, Patient/Caregiver Education & Training, Safety Education & Training, Home Exercise Program, Neuromuscular Re-education, Manual Therapy - Soft Tissue Mobilization, Manual Therapy - Joint Manipulation, Pain Management  Safety Devices  Type of devices:  All fall risk precautions in place, Gait belt  Restraints  Initially in place: No  Timed Code Treatment Minutes: 25 Minutes     Therapy Time   Individual Concurrent Group Co-treatment   Time In 0915         Time Out 0945         Minutes 30         Timed Code Treatment Minutes: 25 Minutes   Therapeutic Exercise x 2 charges    Evelyn Ellington PT    Electronically signed by Evelyn Ellington PT [id number] on 1/26/2021 at 1:28 PM

## 2021-01-28 ENCOUNTER — HOSPITAL ENCOUNTER (OUTPATIENT)
Dept: PHYSICAL THERAPY | Age: 61
Setting detail: THERAPIES SERIES
Discharge: HOME | End: 2021-01-28
Payer: COMMERCIAL

## 2021-01-28 NOTE — PROGRESS NOTES
Physical Therapy  Cancellation/No-show Note  Patient Name:  Mira Maldonado  :  1960   Date:  2021  Cancelled visits to date: 0  No-shows to date: 0    For today's appointment patient:  [x]  Cancelled  []  Rescheduled appointment  []  No-show     Reason given by patient:  []  Patient ill  []  Conflicting appointment  []  No transportation    []  Conflict with work  []  No reason given  [x]  Other:     Comments:   Cancelled d/t hip inflammation.     Electronically signed by:  Martin Childress PT  Electronically signed by Martin Childress, PT [id number] on 2021 at 8:07 AM

## 2021-02-02 ENCOUNTER — HOSPITAL ENCOUNTER (OUTPATIENT)
Dept: PHYSICAL THERAPY | Age: 61
Setting detail: THERAPIES SERIES
Discharge: HOME | End: 2021-02-02
Payer: COMMERCIAL

## 2021-02-02 PROCEDURE — 97530 THERAPEUTIC ACTIVITIES: CPT

## 2021-02-02 PROCEDURE — 97110 THERAPEUTIC EXERCISES: CPT

## 2021-02-02 NOTE — PROGRESS NOTES
Outpatient Physical Therapy  [] [facility]    Phone: [phone]   Fax: [phone]   [x] [facility]  Phone: [phone]   Fax: [phone]  [] [facility]              Phone: [phone]   Fax: [phone]     Physical Therapy Progress Note  Date: 2021        Patient Name:  Darlene Jeanine    :  1960  MRN: [id number]  Restrictions/Precautions:   WBAT  Diagnosis:  L TKA  Treatment Diagnosis:  Decreased strength; Decreased ROM; Difficulty walking; Decreased balance  Insurance/Certification information:  [employer] [address] [employer]  Referring Physician:  Dr. Rogers Carbone of care signed (Y/N):    Visit# / total visits:  10  Pain level: 1/10     Time Period for Report:  2020 - 2021  Cancels/No-shows to date:      Plan of Care/Treatment to date:  [x] Therapeutic Exercise    [x] Modalities:  [x] Therapeutic Activity     [] Ultrasound  [] Electrical Stimulation  [x] Gait Training      [] Cervical Traction    [] Lumbar Traction  [x] Neuromuscular Re-education  [] Cold/hotpack [] Iontophoresis  [x] Instruction in HEP      Other:  [x] Manual Therapy       []    [] Aquatic Therapy       []                          Significant Findings At Last Visit/Comments:    Subjective:  Pt continues to c/o increased pain in L hip after last session. Reports some difficulty walking/standing. Plans to make appointment with Ortho to evaluate. Objective:  ? Observation:    Bed mobility  Supine to Sit: Independent  Sit to Supine: Independent      Transfers  Sit to Stand: Modified independent  Stand to sit: Modified independent    Ambulation  Ambulation?: Yes  Ambulation 1  Device: No Device  Assistance: Independent  Quality of Gait: Pt ambulated slowly without device, step-through pattern, mild L knee flexion and flexed trunk (tight rectus). Pt reported minimal pain, but did require cueing for erect posture. Distance: 48'    Ambulation  ?  Test measurements:      PROM LLE (degrees)  LLE PROM: Exceptions L Knee Flexion 0-145: 102  L Knee Extension 0: -10  AROM LLE (degrees)  L Knee Flexion 0-145: 95  L Knee Extension 0: -12      Strength RLE  Strength RLE: WFL  R Knee Flexion: 3+/5  R Knee Extension: 3+/5     Assessment:  ? Summary: PT completed progress note today. L knee AROM is significantly limited (AROM = 12 - 95) in spite of regular stretching routine. Pt has little difficulty activity his L quad, but is unable to achieve full Ext d/t muslce/joint capsule tightness. He c/o newly increased L hip pain (anterior), possibly d/t increased focus on stretching L rectus. He would benefit from continued therapy to further improve his strength, ROM, gait mechanics and independence ambulating with LRAD. PT requests 8 additional weeks of therapy to address these deficits. Progression Towards Functional goals:  [] Patient is progressing as expected towards functional goals listed. [x] Progression is slowed due to complexities listed. [] Progression has been slowed due to co-morbidities. [] Plan just implemented, too soon to assess goals progression  [] Other:    Goals:    Short term goals  Time Frame for Short term goals: To be achieved in 2 weeks  Short term goal 1: Independent in HEP and progression per patient tolerance, in order to prevent re-injury. - met 1/7    Long term goals  Time Frame for Long term goals : To be achieved in 6 weeks  Long term goal 1: Disability index score of 44 or better for the LEFS to assist with reaching prior level of function. - not met 2/2  Long term goal 2: Patient will demonstrate increased AROM to 3-110 to allow for proper joint functioning as indicated by patients Functional Deficits. - not met 2/2  Long term goal 3: Patient will demonstrate an increase in Strength to 4/5  proximal hip strength and knee  Control in LE to allow for proper functional mobility as indicated by patients Functional Deficits.  - not met 2/2 Long term goal 4: Patient will return to normal gait pattern at a community level - not met 2/2    Patient Goals   Patient goals : To improve L knee strength/ROM      Rehab Potential: [] Excellent [x] Good [x] Fair  [] Poor     Goal Status:  [] Achieved [x] Partially Achieved  [] Not Achieved     Current Frequency/Duration:  # Days per week: [] 1 day # Weeks: [] 1 week [] 4 weeks      [x] 2 days   [] 2 weeks [] 5 weeks      [] 3 days   [] 3 weeks [x] 6 weeks     Patient Status: [] Continue per initial plan of Care     [] Patient now discharged     [x] Additional visits requested, Please re-certify for additional visits:      Requested frequency/duration:  2 X/week for 8 weeks    Electronically signed by:  Saintclair Brock, PT   Electronically signed by Saintclair Brock, PT [id number] on 2/2/2021 at 1:32 PM      If you have any questions or concerns, please don't hesitate to call.   Thank you for your referral.    Physician Signature:________________________________Date:__________________  By signing above, therapists plan is approved by physician

## 2021-02-02 NOTE — PROGRESS NOTES
The Lower Extremity Functional Scale    Patient: Earnest Kelly  : 1960  MRN: [id number]  Date: 2021  Electronically Signed by: Sridhar Goode   Electronically signed by Sridhar Goode, PT [id number] on 2021 at 1:27 PM       We are interested in knowing whether you are having any difficulty at all with the activities listed below because of your lower limb problem for which you are currently seeking attention. Please provide an answer for each activity.          Today would you have difficulty at all with:    Activities Extreme Difficulty or Unable to Perform Activity Quite a Bit of Difficulty Moderate Difficulty A Little Bit of Difficulty No Difficulty   1 Any of your usual work, housework, or school activities []0 []1 []2  [x]3 []4   2 Your usual hobbies, recreational, or sporting activities []0 []1 [x]2 []3 []4   3 Getting into or out of the bath []0 []1 []2 [x]3 []4   4 Walking between rooms []0 []1 []2 [x]3 []4   5 Putting on your shoes or socks []0 []1 [x]2 []3 []4   6 Squatting []0 []1 [x]2 []3 []4   7 Lifting an object, like a bag of groceries from the floor []0 []1 []2 [x]3 []4   8 Performing light activities around your home []0 []1 []2 [x]3 []4   9 Performing heavy activities around your home []0 []1 []2 [x]3 []4   10 Getting into or out of a car []0 []1 []2 [x]3 []4   11 Walking 2 blocks []0 []1 [x]2 []3 []4   12 Walking a mile []0 [x]1 []2 []3 []4   13 Going up or down 10 stairs (about 1 flight of stairs) []0 []1 []2 [x]3 []4   14 Standing for 1 hour []0 []1 [x]2 []3 []4   15 Sitting for 1 hour []0 []1 []2 [x]3 []4   16 Running on even ground  [x]0 []1 []2 []3 []4   17 Running on uneven ground  [x]0 []1 []2 []3 []4   18 Making sharp turns while running fast  [x]0 []1 []2 []3 []4   19 Hopping [x]0 []1 []2 []3 []4   20 Rolling over in bed []0 []1 []2 [x]3 []4    Column Totals:            Patient Score from Above:  41    (Patient Score / 80) X 100:  51.25% Scoring Method for Lower Extremity Functional Scale    The Lower Extremity Functional Scale (LEFS) is an easily administered and scored functional outcome tool. It can be utilized for lower extremity conditions and is sensitive enough for a wide range of functional disability levels. It can and should be used on the initial visit and subsequently on a 2- 3 week basis to measure patient's progress. The tool has a sufficient measure of reliability, variability, and sensitivity to change for determining minimally clinically important score differences, on a test to re-test basis. Scoring:   LEFS Score = (Sum of Responses / 80) X 100    Error + / - 5 points, Minimum Level of Detectable Change (90% Confidence): 9 Points     DANIEL Izaguirre, EUGENIO Tyson, FELICITY De [employer] [address] [place] [address], The Lower Extremity Functional Scale: Scale development, measurement properties, and clinical application, Physical Therapy, 1999, 79, [id number], with permission of the American Physical [employer] Association.       G-Code Crosswalk:  LEFS Total Score Disability Index CMS Modifier   80 0% []CH   79-65 1-19% []CI   64-49 20-39% []CJ   48-33 40-59% [x]CK   32-17 60-79% []CL   16-1 80-99% []CM   0 100% []CN

## 2021-02-02 NOTE — PROGRESS NOTES
Physical Therapy  Daily Treatment Note  Date: 2021  Patient Name: Valencia Roman  MRN: [id number]     :   1960    Subjective:   General  Chart Reviewed: Yes  Additional Pertinent Hx: Pt here for rehab following L TKA 2020. Response To Previous Treatment: Patient with no complaints from previous session. Referring Practitioner: Dr Gracy Rausch  PT Visit Information  Onset Date: 20  PT Insurance Information: [facility]  Total # of Visits to Date: 10  Subjective  Subjective: Pt continues to c/o increased pain in L hip after last session. Reports some difficulty walking/standing. Plans to make appointment with Ortho to evaluate. Treatment Activities:      Bed mobility  Supine to Sit: Independent  Sit to Supine: Independent     Transfers  Sit to Stand: Modified independent  Stand to sit: Modified independent     Ambulation  Ambulation?: Yes  Ambulation 1  Device: No Device  Assistance: Independent  Quality of Gait: Pt ambulated slowly without device, step-through pattern, mild L knee flexion and flexed trunk (tight rectus). Pt reported minimal pain, but did require cueing for erect posture. Distance: 50'     PROM LLE (degrees)  LLE PROM: Exceptions  L Knee Flexion 0-145: 102  L Knee Extension 0: -10  AROM LLE (degrees)  L Knee Flexion 0-145: 95  L Knee Extension 0: -12     Strength RLE  Strength RLE: WFL  R Knee Flexion: 3+/5  R Knee Extension: 3+/5    Exercises  Exercise 1: Nustep level 4 x 7 min. , seat at 13, cues to maximize L knee Ext. Exercise 2: LLE quad set 5 s. hold x 10 with max overpressure from PT. Exercise 3: LLE SLR x 10. Exercise 4: LLE SL press x 10 at 60 lbs. , x 10 at  Exercise 5: Standing L rectus stretch x 2 min. Exercise 6: Standing L IT band stretch x 2 min.      Assessment:   Conditions Requiring Skilled Therapeutic Intervention Body structures, Functions, Activity limitations: Decreased functional mobility ; Decreased ROM; Decreased strength;Decreased balance; Increased pain  Assessment: PT completed progress note today. L knee AROM is significantly limited (AROM = 12 - 95) in spite of regular stretching routine. Pt has little difficulty activity his L quad, but is unable to achieve full Ext d/t muslce/joint capsule tightness. He c/o newly increased L hip pain (anterior), possibly d/t increased focus on stretching L rectus. He would benefit from continued therapy to further improve his strength, ROM, gait mechanics and independence ambulating with LRAD. PT requests 8 additional weeks of therapy to address these deficits. Treatment Diagnosis: Gait and mobility dysfunction s/p left TKA  Decision Making: Low Complexity  REQUIRES PT FOLLOW UP: Yes  Discharge Recommendations: 2-3 sessions per week  Safety Devices  Type of devices: All fall risk precautions in place;Gait belt      OutComes Score  LEFS Total Score: 41 (02/02/21 0984)    Goals:  Short term goals  Time Frame for Short term goals: To be achieved in 2 weeks  Short term goal 1: Independent in HEP and progression per patient tolerance, in order to prevent re-injury. - met 1/7  Long term goals  Time Frame for Long term goals : To be achieved in 6 weeks  Long term goal 1: Disability index score of 44 or better for the LEFS to assist with reaching prior level of function. - not met 2/2  Long term goal 2: Patient will demonstrate increased AROM to 3-110 to allow for proper joint functioning as indicated by patients Functional Deficits. - not met 2/2  Long term goal 3: Patient will demonstrate an increase in Strength to 4/5  proximal hip strength and knee  Control in LE to allow for proper functional mobility as indicated by patients Functional Deficits.  - not met 2/2  Long term goal 4: Patient will return to normal gait pattern at a community level - not met 2/2  Patient Goals Patient goals : To improve L knee strength/ROM    Plan:    Plan  Times per week: 2-3x  Specific instructions for Next Treatment: Improve L knee Flex/Ext strength/ROM  Current Treatment Recommendations: Strengthening, ROM, Balance Training, Gait Training, Endurance Training, Stair training, Positioning, Modalities, Equipment Evaluation, Education, & procurement, Patient/Caregiver Education & Training, Safety Education & Training, Home Exercise Program, Neuromuscular Re-education, Manual Therapy - Soft Tissue Mobilization, Manual Therapy - Joint Manipulation, Pain Management  Safety Devices  Type of devices:  All fall risk precautions in place, Gait belt  Restraints  Initially in place: No  Timed Code Treatment Minutes: 40 Minutes     Therapy Time   Individual Concurrent Group Co-treatment   Time In 0900         Time Out 0945         Minutes 45         Timed Code Treatment Minutes: 40 Minutes   Therapeutic Exercise x 2 charges  Therapeutic Activity x 1 charges    Sam Matthew PT    Electronically signed by Sam Matthew, PT [id number] on 2/2/2021 at 1:26 PM

## 2021-02-04 ENCOUNTER — HOSPITAL ENCOUNTER (OUTPATIENT)
Dept: PHYSICAL THERAPY | Age: 61
Setting detail: THERAPIES SERIES
Discharge: HOME | End: 2021-02-04
Payer: COMMERCIAL

## 2021-02-04 PROCEDURE — 97110 THERAPEUTIC EXERCISES: CPT

## 2021-02-04 NOTE — PROGRESS NOTES
Physical Therapy  Daily Treatment Note  Date: 2021  Patient Name: Vinayak Bravo  MRN: [id number]     :   1960    Subjective:   General  Chart Reviewed: Yes  Additional Pertinent Hx: Pt here for rehab following L TKA 2020. Response To Previous Treatment: Patient with no complaints from previous session. Referring Practitioner: Dr Maritza Hawk  PT Visit Information  Onset Date: 20  PT Insurance Information: BCBS  Total # of Visits to Date: 11  Subjective  Subjective: Pt reports hip pain is somewhat decreased. Will go to see Ortho this afternoon. Treatment Activities:      Manual therapy  PROM: L rectus stretch (prone) x 4 min. L piriformis stretch x 3 min. L knee Ext stretch x 1 min. Supine rectus/Hip ABD stretch x 2 min. L hip IR stretch x 2 min. Transfers  Sit to Stand: Independent  Stand to sit: Independent     Ambulation  Ambulation?: Yes  Ambulation 1  Device: No Device  Assistance: Independent  Quality of Gait: Pt ambulated slowly without device, step-through pattern, mild L knee flexion and flexed trunk (tight rectus). Pt reported minimal pain, but did require cueing for erect posture. Pt educated to goal of achieving L heel strike. Distance: 100'     PROM LLE (degrees)  L Knee Flexion 0-145: 106  AROM LLE (degrees)  L Knee Flexion 0-145: 100     Exercises  Exercise 1: Nustep level 4 x 5 min. , seat at 13, cues to maximize L knee Ext. Exercise 2: LLE quad set 5 s. hold x 10 with max overpressure from PT. Exercise 3: LLE SLR x 10. Exercise 4: LLE step-up 2 x 10 on 4\" step, no UE support, supervision. Exercise 5: LLE step-up 2 x 10 on 6\" step, no UE support, supervision. Assessment:   Conditions Requiring Skilled Therapeutic Intervention  Body structures, Functions, Activity limitations: Decreased functional mobility ; Decreased ROM; Decreased strength;Decreased balance; Increased pain Type of devices:  All fall risk precautions in place, Gait belt  Restraints  Initially in place: No  Timed Code Treatment Minutes: 45 Minutes     Therapy Time   Individual Concurrent Group Co-treatment   Time In 0900         Time Out 1000         Minutes 60         Timed Code Treatment Minutes: 45 Minutes   Therapeutic Exercise x 3 charges    Martin Childress, PT    Electronically signed by Martin Childress, PT [id number] on 2/4/2021 at 10:07 AM

## 2021-02-09 ENCOUNTER — HOSPITAL ENCOUNTER (OUTPATIENT)
Dept: PHYSICAL THERAPY | Age: 61
Setting detail: THERAPIES SERIES
Discharge: HOME | End: 2021-02-09
Payer: COMMERCIAL

## 2021-02-09 PROCEDURE — 97110 THERAPEUTIC EXERCISES: CPT

## 2021-02-09 NOTE — PROGRESS NOTES
Physical Therapy  Daily Treatment Note  Date: 2021  Patient Name: Karly Baig  MRN: [id number]     :   1960    Subjective:   General  Chart Reviewed: Yes  Additional Pertinent Hx: Pt here for rehab following L TKA 2020. Response To Previous Treatment: Patient with no complaints from previous session. Referring Practitioner: Dr Shanti Simmons  PT Visit Information  Onset Date: 20  PT Insurance Information: [facility]  Total # of Visits to Date: 12  Subjective  Subjective: Pt reports MD noted arthritis in L hip, but no plans for additional surgery after his appointment Thursday. Treatment Activities:   Manual therapy  PROM: L rectus stretch (prone) x 3 min. L knee Ext stretch x 3 min. Supine rectus/Hip ABD stretch x 4 min. L hip IR stretch x 2 min. Exercises  Exercise 1: Nustep level 4 x 5 min. , seat at 12, cues to maximize L knee Ext. Exercise 2: LLE quad set 5 s. hold x 10 with max overpressure from PT. Exercise 3: (B) ankle DF stretch on wedge x 3 min. Exercise 4: LLE knee Ext step stretch x 3 min. Exercise 5: LLE knee Flex step stretch x 3 min. Exercise 6: Standing rectus stretch with L quad set x 20. Exercise 7: Ambulation x 6 min. on treadmill at .6 mph, partial step-through pattern, no UE support, no LOB. Assessment:   Conditions Requiring Skilled Therapeutic Intervention  Body structures, Functions, Activity limitations: Decreased functional mobility ; Decreased ROM; Decreased strength;Decreased balance; Increased pain  Assessment: ROM limited by pain, but pt demonstrated improved posture ambulating on treadmill without support (.6 mph). Pt would benefit from continued therapy to further improve gait mechanics, strength, and independence ambulating.   Treatment Diagnosis: Gait and mobility dysfunction s/p left TKA  Decision Making: Low Complexity  REQUIRES PT FOLLOW UP: Yes  Discharge Recommendations: 2-3 sessions per week  Safety Devices Type of devices: All fall risk precautions in place;Gait belt     Goals:  Short term goals  Time Frame for Short term goals: To be achieved in 2 weeks  Short term goal 1: Independent in HEP and progression per patient tolerance, in order to prevent re-injury. - met 1/7  Long term goals  Time Frame for Long term goals : To be achieved in 6 weeks  Long term goal 1: Disability index score of 44 or better for the LEFS to assist with reaching prior level of function. - not met 2/2  Long term goal 2: Patient will demonstrate increased AROM to 3-110 to allow for proper joint functioning as indicated by patients Functional Deficits. - not met 2/2  Long term goal 3: Patient will demonstrate an increase in Strength to 4/5  proximal hip strength and knee  Control in LE to allow for proper functional mobility as indicated by patients Functional Deficits. - not met 2/2  Long term goal 4: Patient will return to normal gait pattern at a community level - not met 2/2  Patient Goals   Patient goals : To improve L knee strength/ROM    Plan:    Plan  Times per week: 2-3x  Specific instructions for Next Treatment: Improve L knee Flex/Ext strength/ROM  Current Treatment Recommendations: Strengthening, ROM, Balance Training, Gait Training, Endurance Training, Stair training, Positioning, Modalities, Equipment Evaluation, Education, & procurement, Patient/Caregiver Education & Training, Safety Education & Training, Home Exercise Program, Neuromuscular Re-education, Manual Therapy - Soft Tissue Mobilization, Manual Therapy - Joint Manipulation, Pain Management  Safety Devices  Type of devices:  All fall risk precautions in place, Gait belt  Restraints  Initially in place: No  Timed Code Treatment Minutes: 54 Minutes     Therapy Time   Individual Concurrent Group Co-treatment   Time In 0900         Time Out 1000         Minutes 60         Timed Code Treatment Minutes: 54 Minutes   Therapeutic Exercise x 4 charges    Madeleine Harrington, PT Electronically signed by Erik Archer, PT [id number] on 2/9/2021 at 10:27 AM

## 2021-02-11 ENCOUNTER — HOSPITAL ENCOUNTER (OUTPATIENT)
Dept: PHYSICAL THERAPY | Age: 61
Setting detail: THERAPIES SERIES
Discharge: HOME | End: 2021-02-11
Payer: COMMERCIAL

## 2021-02-11 PROCEDURE — 97110 THERAPEUTIC EXERCISES: CPT

## 2021-02-11 PROCEDURE — 97116 GAIT TRAINING THERAPY: CPT

## 2021-02-11 NOTE — PROGRESS NOTES
Physical Therapy  Daily Treatment Note  Date: 2021  Patient Name: Derek Berkowitz  MRN: [id number]     :   1960    Subjective:   General  Chart Reviewed: Yes  Additional Pertinent Hx: Pt here for rehab following L TKA 2020. Response To Previous Treatment: Patient with no complaints from previous session. Referring Practitioner: Dr Marisol Hassan  PT Visit Information  Onset Date: 20  PT Insurance Information: [facility]  Total # of Visits to Date: 13  Subjective  Subjective: Pt agreeable to session. Denies significant pain today. Treatment Activities:   Manual therapy  PROM: L rectus stretch (prone) x 5 min. L HS stretch x 3 min. medial, x 2 min. lateral.  Supine rectus/Hip ABD stretch x 5 min. L hip IR stretch x 2 min. Stairs/Curb  Stairs?: Yes  Stairs  # Steps : 12  Rails: Bilateral;None  Assistance: Supervision  Comment: Pt initially ascended/descended 4 steps with (B) UE support, reciprocal pattern, Mod I. Pt then able to ascend/descend 4 steps  with single hand support, reciprocal pattern, Mod I. Finally, pt able to ascend/descend 4 steps without UE support, reciprocal pattern, supervision, no LOB. Exercises  Exercise 1: Nustep level 4 x 5 min. , seat at 12, cues to maximize L knee Ext. Exercise 2: L knee Ext step stretch x 2 min., Flex stretch x 2 min. Exercise 3: (B) ankle DF stretch on wedge x 3 min. Exercise 4: Prone: L knee Flex AAROM x 20. Exercise 5: Prone: L hip IR/ER AAROM x 20. Exercise 6: Standing rectus stretch with L quad set x 20. Exercise 7: Ambulation x 4 min. on treadmill at 1.2 mph,  step-through pattern, no UE support, cuers to maximize L knee EXT, no LOB. Assessment:   Conditions Requiring Skilled Therapeutic Intervention  Body structures, Functions, Activity limitations: Decreased functional mobility ; Decreased ROM; Decreased strength;Decreased balance; Increased pain Assessment: Pt tolerated increased stretching today. Pt able to ascend/descend 4 steps without UE support, reciprocal pattern, supervision, and ambulated increased speed on treadmill (1.2 mph), moving with reciprocal pattern, no difficulty. He would benefit from continued therapy to futher improve strength, ROM, balance, and independence ambulating. Treatment Diagnosis: Gait and mobility dysfunction s/p left TKA  REQUIRES PT FOLLOW UP: Yes  Discharge Recommendations: 2-3 sessions per week  Safety Devices  Type of devices: All fall risk precautions in place;Gait belt     Goals:  Short term goals  Time Frame for Short term goals: To be achieved in 2 weeks  Short term goal 1: Independent in HEP and progression per patient tolerance, in order to prevent re-injury. - met 1/7  Long term goals  Time Frame for Long term goals : To be achieved in 6 weeks  Long term goal 1: Disability index score of 44 or better for the LEFS to assist with reaching prior level of function. - not met 2/2  Long term goal 2: Patient will demonstrate increased AROM to 3-110 to allow for proper joint functioning as indicated by patients Functional Deficits. - not met 2/2  Long term goal 3: Patient will demonstrate an increase in Strength to 4/5  proximal hip strength and knee  Control in LE to allow for proper functional mobility as indicated by patients Functional Deficits. - not met 2/2  Long term goal 4: Patient will return to normal gait pattern at a community level - not met 2/2  Patient Goals   Patient goals :  To improve L knee strength/ROM    Plan:    Plan  Specific instructions for Next Treatment: Improve L knee Flex/Ext strength/ROM Current Treatment Recommendations: Strengthening, ROM, Balance Training, Gait Training, Endurance Training, Stair training, Positioning, Modalities, Equipment Evaluation, Education, & procurement, Patient/Caregiver Education & Training, Safety Education & Training, Home Exercise Program, Neuromuscular Re-education, Manual Therapy - Soft Tissue Mobilization, Manual Therapy - Joint Manipulation, Pain Management  Safety Devices  Type of devices:  All fall risk precautions in place, Gait belt  Restraints  Initially in place: No  Timed Code Treatment Minutes: 55 Minutes     Therapy Time   Individual Concurrent Group Co-treatment   Time In 0900         Time Out 1007         Minutes 67         Timed Code Treatment Minutes: 55 Minutes   Gait training x 1 charge  Therapeutic Exercise x 3 charges    Luz Maria Perez PT    Electronically signed by Luz Maria Perez, PT [id number] on 2/11/2021 at 10:11 AM

## 2021-02-18 ENCOUNTER — HOSPITAL ENCOUNTER (OUTPATIENT)
Dept: PHYSICAL THERAPY | Age: 61
Setting detail: THERAPIES SERIES
Discharge: HOME | End: 2021-02-18
Payer: COMMERCIAL

## 2021-02-18 PROCEDURE — 97140 MANUAL THERAPY 1/> REGIONS: CPT

## 2021-02-18 PROCEDURE — 97110 THERAPEUTIC EXERCISES: CPT

## 2021-02-18 NOTE — PROGRESS NOTES
Physical Therapy  Daily Treatment Note  Date: 2021  Patient Name: Vinayak Bravo  MRN: [id number]     :   1960    Subjective:   General  Chart Reviewed: Yes  Additional Pertinent Hx: Pt here for rehab following L TKA 2020. Response To Previous Treatment: Patient with no complaints from previous session. Referring Practitioner: Dr Maritza Hawk  PT Visit Information  Onset Date: 20  PT Insurance Information: [employer]  Total # of Visits to Date: 14  Subjective  Subjective: Pt agreeable to session. Denies significant pain today. Treatment Activities:      Manual therapy  Joint mobilization: Posterior glide L knee grade III x 4 min. PROM: L rectus stretch (prone - limited d/t pain) x 5 min. Supine rectus/Hip ABD stretch x 5 min. L hip IR stretch x 2 min. Exercises  Exercise 1: Nustep level 4 x 5 min. , seat at 12, cues to maximize L knee Ext. Exercise 2: L knee Ext step stretch x 2 min., Flex stretch x 2 min. Exercise 3: (B) ankle DF stretch on wedge x 3 min. Exercise 4: Prone: L knee Flex AROM 2 x 10. Exercise 5: RLE/LLE SLS 2 x 10 s., intermittent hand support. Exercise 6: RLE/LLE SL mini-squat with hand support, 2 x 10, no pain. Exercise 7: Ambulation x 4 min. on treadmill at 1.2 mph,  step-through pattern, no UE support, cues to maximize L knee EXT, no LOB. Exercise 8: While maintaining SLS LLE, RUE support, pt practiced forward bend x 10, accentuating L knee quad contraction. Exercise 9: Supine: L quad set x 10. Quad set with overpressure x 10. Other Activities  Other Activities: Other (see comment)  Comment: Ice applied to L hip/knee at end of session. Assessment:   Conditions Requiring Skilled Therapeutic Intervention  Body structures, Functions, Activity limitations: Decreased functional mobility ; Decreased ROM; Decreased strength;Decreased balance; Increased pain Assessment: L knee Flex and Ext ROM remain limited, but pt demonstrates progressively improved ambulation tolerance, and balance. Plan for 2 more weeks of therapy. If no improvement in L knee AROM, will D/C with home exercises for continued stretching/strengthening. Treatment Diagnosis: Gait and mobility dysfunction s/p left TKA  Decision Making: Low Complexity  REQUIRES PT FOLLOW UP: Yes  Discharge Recommendations: 2-3 sessions per week  Safety Devices  Type of devices: All fall risk precautions in place;Gait belt        Goals:  Short term goals  Time Frame for Short term goals: To be achieved in 2 weeks  Short term goal 1: Independent in HEP and progression per patient tolerance, in order to prevent re-injury. - met 1/7  Long term goals  Time Frame for Long term goals : To be achieved in 6 weeks  Long term goal 1: Disability index score of 44 or better for the LEFS to assist with reaching prior level of function. - not met 2/2  Long term goal 2: Patient will demonstrate increased AROM to 3-110 to allow for proper joint functioning as indicated by patients Functional Deficits. - not met 2/2  Long term goal 3: Patient will demonstrate an increase in Strength to 4/5  proximal hip strength and knee  Control in LE to allow for proper functional mobility as indicated by patients Functional Deficits. - not met 2/2  Long term goal 4: Patient will return to normal gait pattern at a community level - not met 2/2  Patient Goals   Patient goals :  To improve L knee strength/ROM    Plan:    Plan  Times per week: 2-3x  Specific instructions for Next Treatment: Improve L knee Flex/Ext strength/ROM Current Treatment Recommendations: Strengthening, ROM, Balance Training, Gait Training, Endurance Training, Stair training, Positioning, Modalities, Equipment Evaluation, Education, & procurement, Patient/Caregiver Education & Training, Safety Education & Training, Home Exercise Program, Neuromuscular Re-education, Manual Therapy - Soft Tissue Mobilization, Manual Therapy - Joint Manipulation, Pain Management  Safety Devices  Type of devices:  All fall risk precautions in place, Gait belt  Restraints  Initially in place: No  Timed Code Treatment Minutes: 55 Minutes     Therapy Time   Individual Concurrent Group Co-treatment   Time In 0910         Time Out 1015         Minutes 65         Timed Code Treatment Minutes: 55 Minutes   Manual therapy x 1 charge  Therapeutic Exercise x 3 charges    Erik Archer PT    Electronically signed by Erik Archer, ARACELI [id number]  on 2/18/2021 at 12:01 PM

## 2021-02-23 ENCOUNTER — HOSPITAL ENCOUNTER (OUTPATIENT)
Dept: PHYSICAL THERAPY | Age: 61
Setting detail: THERAPIES SERIES
Discharge: HOME | End: 2021-02-23
Payer: COMMERCIAL

## 2021-02-23 PROCEDURE — 97110 THERAPEUTIC EXERCISES: CPT

## 2021-02-23 PROCEDURE — 97140 MANUAL THERAPY 1/> REGIONS: CPT

## 2021-02-23 NOTE — PROGRESS NOTES
Physical Therapy  Daily Treatment Note  Date: 2021  Patient Name: Bjorn Guillen  MRN: [id number]     :   1960    Subjective:   General  Chart Reviewed: Yes  Additional Pertinent Hx: Pt here for rehab following L TKA 2020. Response To Previous Treatment: Patient with no complaints from previous session. Referring Practitioner: Dr Cy Jasso  PT Visit Information  Onset Date: 20  PT Insurance Information: [facility]  Total # of Visits to Date: 15  Subjective  Subjective: Pt reports intermittent soreness in L hip, not much trouble with knee. Not using cane today. Treatment Activities:   Manual therapy  Joint mobilization: L knee patellar mobs sup/inf x 2 min., Med/lat x 2 min. PROM: L rectus stretch (supine) x 3 min. L hip ABD stretch x 2 min., ADD stretch x 2 min., HS stretch x 2 min. Exercises  Exercise 1: Nustep level 4 x 5 min. , seat at 12, cues to maximize L knee Ext. Exercise 2: L knee Ext step stretch x 2 min., Flex stretch x 2 min. Exercise 3: (B) ankle DF stretch on wedge x 3 min. Exercise 4: RLE/LLE toe-tap on 6\" step x 15, SBA. Exercise 5: RLE/LLE SLS 5 x 10 s., SBA, mild instability, no UE support. Exercise 6: RLE leading tandem stance 3 x 10 s., LLE leading tandem stance 3 x 10 s., SBA. Exercise 7: RLE forward in lunge position, pt practiced LLE quad set 10 x 5 s. hold, cues to maximize knee ext. Exercise 8: While maintaining SLS LLE, RUE support, pt practiced forward bend x 10, accentuating L knee quad contraction. Other Activities  Other Activities: Other (see comment)  Comment: Ice applied to L hip/knee at end of session. Assessment:   Conditions Requiring Skilled Therapeutic Intervention  Body structures, Functions, Activity limitations: Decreased functional mobility ; Decreased ROM; Decreased strength;Decreased balance; Increased pain Assessment: Pt able to tolerate more L knee flex stretching. Hip stretching remains limited (particularly rectus stretching) d/t pain and chronic stiffness. Pt did not require use of cane when coming to his appointment today, and he did very well with high-level balance exercises. He would benefit from continued therapy to maximize his knee strength and ROM. Treatment Diagnosis: Gait and mobility dysfunction s/p left TKA  Decision Making: Low Complexity  REQUIRES PT FOLLOW UP: Yes  Discharge Recommendations: 2-3 sessions per week  Safety Devices  Type of devices: All fall risk precautions in place;Gait belt      Goals:  Short term goals  Time Frame for Short term goals: To be achieved in 2 weeks  Short term goal 1: Independent in HEP and progression per patient tolerance, in order to prevent re-injury. - met 1/7  Long term goals  Time Frame for Long term goals : To be achieved in 6 weeks  Long term goal 1: Disability index score of 44 or better for the LEFS to assist with reaching prior level of function. - not met 2/2  Long term goal 2: Patient will demonstrate increased AROM to 3-110 to allow for proper joint functioning as indicated by patients Functional Deficits. - not met 2/2  Long term goal 3: Patient will demonstrate an increase in Strength to 4/5  proximal hip strength and knee  Control in LE to allow for proper functional mobility as indicated by patients Functional Deficits. - not met 2/2  Long term goal 4: Patient will return to normal gait pattern at a community level - not met 2/2  Patient Goals   Patient goals :  To improve L knee strength/ROM    Plan:    Plan  Times per week: 2-3x  Specific instructions for Next Treatment: Improve L knee Flex/Ext strength/ROM Current Treatment Recommendations: Strengthening, ROM, Balance Training, Gait Training, Endurance Training, Stair training, Positioning, Modalities, Equipment Evaluation, Education, & procurement, Patient/Caregiver Education & Training, Safety Education & Training, Home Exercise Program, Neuromuscular Re-education, Manual Therapy - Soft Tissue Mobilization, Manual Therapy - Joint Manipulation, Pain Management  Safety Devices  Type of devices:  All fall risk precautions in place, Gait belt  Restraints  Initially in place: No  Timed Code Treatment Minutes: 55 Minutes     Therapy Time   Individual Concurrent Group Co-treatment   Time In 0900         Time Out 1005         Minutes 65         Timed Code Treatment Minutes: 55 Minutes   Therapeutic Exercise x 3 charges  Manual therapy x 1 charge    Dixie Duckworth PT    Electronically signed by Dixie Duckworth, ARACELI [id number] on 2/23/2021 at 10:11 AM

## 2021-02-25 ENCOUNTER — HOSPITAL ENCOUNTER (OUTPATIENT)
Dept: PHYSICAL THERAPY | Age: 61
Setting detail: THERAPIES SERIES
Discharge: HOME | End: 2021-02-25
Payer: COMMERCIAL

## 2021-02-25 PROCEDURE — 97110 THERAPEUTIC EXERCISES: CPT

## 2021-02-25 PROCEDURE — 97140 MANUAL THERAPY 1/> REGIONS: CPT

## 2021-02-25 NOTE — PROGRESS NOTES
Physical Therapy  Daily Treatment Note  Date: 2021  Patient Name: Bjorn Guillen  MRN: [id number]     :   1960    Subjective:   General  Chart Reviewed: Yes  Additional Pertinent Hx: Pt here for rehab following L TKA 2020. Response To Previous Treatment: Patient with no complaints from previous session. Referring Practitioner: Dr Cy Jasso  PT Visit Information  Onset Date: 20  PT Insurance Information: [facility]  Total # of Visits to Date: 16  Subjective  Subjective: Pt reports hip is mildly improved. Has been ambulating consistently without cane. Treatment Activities:      Manual therapy  Joint mobilization: L knee patellar mobs sup/inf x 2 min., Med/lat x 2 min. PROM: L rectus stretch (supine) x 3 min. L hip ABD stretch x 3 min., ADD stretch x 2 min., Piriformis stretch x 2 min. Transfers  Sit to Stand: Independent  Stand to sit: Independent        Ambulation  Ambulation?: Yes  Ambulation 1  Device: No Device  Assistance: Independent  Quality of Gait: Pt ambulated with improved speed and nearly erect posture. He is able to achieve moderate heel strike with LLE, although he cannot obtain full knee ext. No c/o pain, no LOB. Distance: 200'    Exercises  Exercise 1: Nustep level 4 x 5 min. , seat at 12, cues to maximize L knee Ext. Exercise 2: L knee Ext step stretch x 2 min., Flex stretch x 2 min. Exercise 3: (B) ankle DF stretch on wedge x 3 min. Exercise 4: RLE/LLE lunge x 10, no UE support, mild instability. Exercise 5: LLE SL heel raise x 15, no LOB. Exercise 6: LLE quad set 10 x 10 s. hold. Exercise 7: LLE SLR 10 x 5 s. hold. Assessment:   Conditions Requiring Skilled Therapeutic Intervention  Body structures, Functions, Activity limitations: Decreased functional mobility ; Decreased ROM; Decreased strength;Decreased balance; Increased pain Assessment: Pt tolerated stretching, and LE strengthening exercises without difficulty. He is consistently ambulating without cane support. Activity tolerance is most limited by hip pain with increased stretching. He denies any falls at home. Anticipate 3-4 more visits to maximize LE strength, balance, and knee ROM. Treatment Diagnosis: Gait and mobility dysfunction s/p left TKA  Decision Making: Low Complexity  REQUIRES PT FOLLOW UP: Yes  Discharge Recommendations: 2-3 sessions per week  Safety Devices  Type of devices: All fall risk precautions in place;Gait belt     Goals:  Short term goals  Time Frame for Short term goals: To be achieved in 2 weeks  Short term goal 1: Independent in HEP and progression per patient tolerance, in order to prevent re-injury. - met 1/7  Long term goals  Time Frame for Long term goals : To be achieved in 6 weeks  Long term goal 1: Disability index score of 44 or better for the LEFS to assist with reaching prior level of function. - not met 2/2  Long term goal 2: Patient will demonstrate increased AROM to 3-110 to allow for proper joint functioning as indicated by patients Functional Deficits. - not met 2/2  Long term goal 3: Patient will demonstrate an increase in Strength to 4/5  proximal hip strength and knee  Control in LE to allow for proper functional mobility as indicated by patients Functional Deficits. - not met 2/2  Long term goal 4: Patient will return to normal gait pattern at a community level - not met 2/2  Patient Goals   Patient goals :  To improve L knee strength/ROM    Plan:    Plan  Times per week: 2-3x  Specific instructions for Next Treatment: Improve L knee Flex/Ext strength/ROM Current Treatment Recommendations: Strengthening, ROM, Balance Training, Gait Training, Endurance Training, Stair training, Positioning, Modalities, Equipment Evaluation, Education, & procurement, Patient/Caregiver Education & Training, Safety Education & Training, Home Exercise Program, Neuromuscular Re-education, Manual Therapy - Soft Tissue Mobilization, Manual Therapy - Joint Manipulation, Pain Management  Safety Devices  Type of devices:  All fall risk precautions in place, Gait belt  Restraints  Initially in place: No  Timed Code Treatment Minutes: 55 Minutes     Therapy Time   Individual Concurrent Group Co-treatment   Time In 0900         Time Out 1010         Minutes 70         Timed Code Treatment Minutes: 55 Minutes   Manual therapy x 1 charge  Therapeutic Exercise x 3 charges    Niya Martínez, PT    Electronically signed by Niya Martínez, PT [id number] on 2/25/2021 at 10:29 AM

## 2021-03-02 ENCOUNTER — HOSPITAL ENCOUNTER (OUTPATIENT)
Dept: PHYSICAL THERAPY | Age: 61
Setting detail: THERAPIES SERIES
Discharge: HOME | End: 2021-03-02
Payer: COMMERCIAL

## 2021-03-02 PROCEDURE — 97140 MANUAL THERAPY 1/> REGIONS: CPT

## 2021-03-02 PROCEDURE — 97110 THERAPEUTIC EXERCISES: CPT

## 2021-03-09 ENCOUNTER — HOSPITAL ENCOUNTER (OUTPATIENT)
Dept: PHYSICAL THERAPY | Age: 61
Setting detail: THERAPIES SERIES
Discharge: HOME | End: 2021-03-09
Payer: COMMERCIAL

## 2021-03-09 PROCEDURE — 97530 THERAPEUTIC ACTIVITIES: CPT

## 2021-03-09 PROCEDURE — 97110 THERAPEUTIC EXERCISES: CPT

## 2021-03-09 NOTE — PROGRESS NOTES
Outpatient Physical Therapy  [] [facility]    Phone: [phone]   Fax: [phone]   [x] [facility]  Phone: [phone]   Fax: [phone]  [] [facility]              Phone: [phone]   Fax: [phone]     Physical Therapy Discharge Note  Date: 3/9/2021        Patient Name:  Jitendra Moreno    :  1960  MRN: [id number]  Restrictions/Precautions:   WBAT  Diagnosis:  L TKA  Treatment Diagnosis:  Decreased strength; Decreased ROM; Decreased balance; Increased pain  Insurance/Certification information:  [address]  Referring Physician:  Dr. Milagros Richardson of care signed (Y/N):    Visit# / total visits:  19/  Pain level: 0/10     Time Period for Report:   2021 - 3/9/2021  Cancels/No-shows to date:      Plan of Care/Treatment to date:  [x] Therapeutic Exercise    [x] Modalities:  [x] Therapeutic Activity     [] Ultrasound  [] Electrical Stimulation  [x] Gait Training      [] Cervical Traction    [] Lumbar Traction  [x] Neuromuscular Re-education  [] Cold/hotpack [] Iontophoresis  [x] Instruction in HEP      Other:  [x] Manual Therapy       []    [] Aquatic Therapy       []                          Significant Findings At Last Visit/Comments:    Subjective:  Pt denies any knee pain. Wants to be discharged. Will complete final assessment. Objective:   Observation:    Bed mobility  Supine to Sit: Independent  Sit to Supine: Independent      Transfers  Sit to Stand: Independent  Stand to sit: Independent    Ambulation  Ambulation?: Yes  Ambulation 1  Surface: level tile  Device: No Device  Assistance: Independent  Quality of Gait: Pt ambulated with near-erect posture, step-through pattern, improved heel strike LLE, no LOB, no c/o pain. Distance: 100'     Stairs  # Steps : 14  Rails: None  Assistance: Independent  Comment: Pt able to quickly ascend/descend steps with reciprocal pattern, no UE support, no LOB, no c/o pain.      Test measurements:      PROM LLE (degrees)  LLE PROM: Exceptions L Knee Flexion 0-145: 103  L Knee Extension 0: -10 from full extension  AROM LLE (degrees)  L Knee Flexion 0-145: 96  L Knee Extension 0: 13      Strength LLE  L Hip Flexion: 4+/5  L Hip Extension: 4+/5  L Hip ABduction: 4+/5  L Knee Flexion: 4+/5  L Knee Extension: 4+/5  L Ankle Dorsiflexion: 5/5    OutComes Score  LEFS Total Score: 50 (03/09/21 3715)     Assessment:   Summary: PT completed final assessment today. Pt has met all of his short term goals, and all of his long-term goals except for 1 (achieving knee AROM 3-110). His knee ROM is chronically limited, and he was unable to tolerate the level of stretching/mobilization to achieve typical ROM values (0-120). However, his hip and knee strength have significantly improved, and he is now ambulating at home and in the community without device, independently, reporting mild soreness with increased activity, but gradually tolerating more time in the community. His LEFS score has improved to goal status, idicating increased independence with self-care/homemaking tasks. He is happy with his progress in therapy, and will now be discharged. PT provided him with knee strengthening/stretching written HEP. Progression Towards Functional goals:  [x] Patient is progressing as expected towards functional goals listed. [x] Progression is slowed due to complexities listed. [] Progression has been slowed due to co-morbidities. [] Plan just implemented, too soon to assess goals progression  [] Other:    Goals:    Short term goals  Time Frame for Short term goals: To be achieved in 2 weeks  Short term goal 1: Independent in HEP and progression per patient tolerance, in order to prevent re-injury. - met 1/7    Long term goals  Time Frame for Long term goals : To be achieved in 6 weeks  Long term goal 1: Disability index score of 44 or better for the LEFS to assist with reaching prior level of function.  - not met 2/2; met 3/9  Long term goal 2: Patient will demonstrate increased AROM to 3-110 to allow for proper joint functioning as indicated by patients Functional Deficits. - not met 2/2, not met 3/9  Long term goal 3: Patient will demonstrate an increase in Strength to 4/5  proximal hip strength and knee  Control in LE to allow for proper functional mobility as indicated by patients Functional Deficits. - not met 2/2; met 3/9  Long term goal 4: Patient will return to normal gait pattern at a community level - not met 2/2; met 3/9    Patient Goals   Patient goals : To improve L knee strength/ROM      Rehab Potential: [] Excellent [x] Good [] Fair  [] Poor     Goal Status:  [] Achieved [x] Partially Achieved  [] Not Achieved     Current Frequency/Duration:  # Days per week: [] 1 day # Weeks: [] 1 week [] 4 weeks      [x] 2 days   [] 2 weeks [] 5 weeks      [] 3 days   [] 3 weeks [x] 6 weeks     Patient Status: [] Continue per initial plan of Care     [x] Patient now discharged     [] Additional visits requested, Please re-certify for additional visits:      Requested frequency/duration:  X/week for weeks    Electronically signed by:  Jenn Koo PT  Electronically signed by Jenn Koo PT [id number] on 3/9/2021 at 10:21 AM    If you have any questions or concerns, please don't hesitate to call.   Thank you for your referral.    Physician Signature:________________________________Date:__________________  By signing above, therapists plan is approved by physician

## 2021-03-09 NOTE — PROGRESS NOTES
The Lower Extremity Functional Scale    Patient: Osmel Ledezma  : 1960  MRN: [id number]  Date: 3/9/2021  Electronically Signed by: Jacoby Segundo   Electronically signed by Jacoby Segundo, PT [id number] on 3/9/2021 at 10:18 AM       We are interested in knowing whether you are having any difficulty at all with the activities listed below because of your lower limb problem for which you are currently seeking attention. Please provide an answer for each activity.          Today would you have difficulty at all with:    Activities Extreme Difficulty or Unable to Perform Activity Quite a Bit of Difficulty Moderate Difficulty A Little Bit of Difficulty No Difficulty   1 Any of your usual work, housework, or school activities []0 []1 []2  [x]3 []4   2 Your usual hobbies, recreational, or sporting activities []0 []1 []2 [x]3 []4   3 Getting into or out of the bath []0 []1 []2 []3 [x]4   4 Walking between rooms []0 []1 []2 [x]3 []4   5 Putting on your shoes or socks []0 []1 []2 [x]3 []4   6 Squatting []0 []1 []2 [x]3 []4   7 Lifting an object, like a bag of groceries from the floor []0 []1 []2 []3 [x]4   8 Performing light activities around your home []0 []1 []2 []3 [x]4   9 Performing heavy activities around your home []0 []1 []2 [x]3 []4   10 Getting into or out of a car []0 []1 []2 [x]3 []4   11 Walking 2 blocks []0 []1 []2 [x]3 []4   12 Walking a mile []0 []1 [x]2 []3 []4   13 Going up or down 10 stairs (about 1 flight of stairs) []0 []1 []2 [x]3 []4   14 Standing for 1 hour []0 []1 [x]2 []3 []4   15 Sitting for 1 hour []0 []1 []2 []3 [x]4   16 Running on even ground  [x]0 []1 []2 []3 []4   17 Running on uneven ground  [x]0 []1 []2 []3 []4   18 Making sharp turns while running fast  [x]0 []1 []2 []3 []4   19 Hopping [x]0 []1 []2 []3 []4   20 Rolling over in bed []0 []1 []2 [x]3 []4    Column Totals:            Patient Score from Above:  50    (Patient Score / 80) X 100:  62.5% Scoring Method for Lower Extremity Functional Scale    The Lower Extremity Functional Scale (LEFS) is an easily administered and scored functional outcome tool. It can be utilized for lower extremity conditions and is sensitive enough for a wide range of functional disability levels. It can and should be used on the initial visit and subsequently on a 2- 3 week basis to measure patient's progress. The tool has a sufficient measure of reliability, variability, and sensitivity to change for determining minimally clinically important score differences, on a test to re-test basis. Scoring:   LEFS Score = (Sum of Responses / 80) X 100    Error + / - 5 points, Minimum Level of Detectable Change (90% Confidence): 9 Points     DANIEL Izaguirre, EUGENIO Tyson, FELICITY [employer] [address] [place] [address], The Lower Extremity Functional Scale: Scale development, measurement properties, and clinical application, Physical Therapy, 1999, 79, [id number], with permission of the [employer].       G-Code Crosswalk:  LEFS Total Score Disability Index CMS Modifier   80 0% []CH   79-65 1-19% []CI   64-49 20-39% [x]CJ   48-33 40-59% []CK   32-17 60-79% []CL   16-1 80-99% []CM   0 100% []CN

## 2021-03-09 NOTE — PROGRESS NOTES
Physical Therapy  Daily Treatment Note  Date: 3/9/2021  Patient Name: Bjorn Guillen  MRN: [id number]     :   1960    Subjective:   General  Chart Reviewed: Yes  Additional Pertinent Hx: Pt here for rehab following L TKA 2020. Response To Previous Treatment: Patient with no complaints from previous session. Referring Practitioner: Dr Cy Jasso  PT Visit Information  Onset Date: 20  PT Insurance Information: [employer]  Total # of Visits to Date: 19  Subjective  Subjective: Pt denies any knee pain. Wants to be discharged. Will complete final assessment. Treatment Activities:      Manual therapy  PROM: L rectus stretch (supine) x 4 min. L hip ABD stretch x 3 min., Piriformis stretch x 3 min. L HS stretch x 3. Bed mobility  Supine to Sit: Independent  Sit to Supine: Independent     Transfers  Sit to Stand: Independent  Stand to sit: Independent     Ambulation  Ambulation?: Yes  Ambulation 1  Surface: level tile  Device: No Device  Assistance: Independent  Quality of Gait: Pt ambulated with near-erect posture, step-through pattern, improved heel strike LLE, no LOB, no c/o pain. Distance: 100'    Stairs  # Steps : 14  Rails: None  Assistance: Independent  Comment: Pt able to quickly ascend/descend steps with reciprocal pattern, no UE support, no LOB, no c/o pain. PROM LLE (degrees)  LLE PROM: Exceptions  L Knee Flexion 0-145: 103  L Knee Extension 0: -10 from full extension  AROM LLE (degrees)  L Knee Flexion 0-145: 96  L Knee Extension 0: 13     Strength LLE  L Hip Flexion: 4+/5  L Hip Extension: 4+/5  L Hip ABduction: 4+/5  L Knee Flexion: 4+/5  L Knee Extension: 4+/5  L Ankle Dorsiflexion: 5/5    Exercises  Exercise 1: Nustep level 7 x 7 min. , seat at 15, cues to maximize L knee Ext. Exercise 2: L knee Ext step stretch x 2 min., Flex stretch x 2 min. Exercise 3: (B) ankle DF stretch on wedge x 3 min.         Assessment:   Conditions Requiring Skilled Therapeutic Intervention Body structures, Functions, Activity limitations: Decreased functional mobility ; Decreased ROM; Decreased strength;Decreased balance; Increased pain  Assessment: PT completed final assessment today. Pt has met all of his short term goals, and all of his long-term goals except for 1 (achieving knee AROM 3-110). His knee ROM is chronically limited, and he was unable to tolerate the level of stretching/mobilization to achieve typical ROM values (0-120). However, his hip and knee strength have significantly improved, and he is now ambulating at home and in the community without device, independently, reporting mild soreness with increased activity, but gradually tolerating more time in the community. His LEFS score has improved to goal status, idicating increased independence with self-care/homemaking tasks. He is happy with his progress in therapy, and will now be discharged. PT provided him with knee strengthening/stretching written HEP. Treatment Diagnosis: Gait and mobility dysfunction s/p left TKA  Decision Making: Low Complexity  Discharge Recommendations: 2-3 sessions per week  Safety Devices  Type of devices: All fall risk precautions in place;Gait belt      OutComes Score  LEFS Total Score: 50 (03/09/21 5880)    Goals:  Short term goals  Time Frame for Short term goals: To be achieved in 2 weeks  Short term goal 1: Independent in HEP and progression per patient tolerance, in order to prevent re-injury. - met 1/7  Long term goals  Time Frame for Long term goals : To be achieved in 6 weeks  Long term goal 1: Disability index score of 44 or better for the LEFS to assist with reaching prior level of function. - not met 2/2; met 3/9  Long term goal 2: Patient will demonstrate increased AROM to 3-110 to allow for proper joint functioning as indicated by patients Functional Deficits.  - not met 2/2, not met 3/9  Long term goal 3: Patient will demonstrate an increase in Strength to 4/5  proximal hip strength and knee  Control in LE to allow for proper functional mobility as indicated by patients Functional Deficits. - not met 2/2; met 3/9  Long term goal 4: Patient will return to normal gait pattern at a community level - not met 2/2; met 3/9  Patient Goals   Patient goals : To improve L knee strength/ROM    Plan:    Plan  Times per week: 2-3x  Specific instructions for Next Treatment: Improve L knee Flex/Ext strength/ROM  Current Treatment Recommendations: Strengthening, ROM, Balance Training, Gait Training, Endurance Training, Stair training, Positioning, Modalities, Equipment Evaluation, Education, & procurement, Patient/Caregiver Education & Training, Safety Education & Training, Home Exercise Program, Neuromuscular Re-education, Manual Therapy - Soft Tissue Mobilization, Manual Therapy - Joint Manipulation, Pain Management  Safety Devices  Type of devices:  All fall risk precautions in place, Gait belt  Restraints  Initially in place: No  Timed Code Treatment Minutes: 40 Minutes     Therapy Time   Individual Concurrent Group Co-treatment   Time In 0905         Time Out 1000         Minutes 55         Timed Code Treatment Minutes: 40 Minutes   Therapeutic Exercise x 2 charges  Therapeutic Activity x 1 charge    Araceli Mcknight PT    Electronically signed by Araceli Mcknight PT [id number] on 3/9/2021 at 10:14 AM

## 2021-03-11 ENCOUNTER — HOSPITAL ENCOUNTER (OUTPATIENT)
Dept: PHYSICAL THERAPY | Age: 61
Setting detail: THERAPIES SERIES
End: 2021-03-11
Payer: COMMERCIAL

## 2021-03-31 ENCOUNTER — OFFICE VISIT (OUTPATIENT)
Dept: FAMILY MEDICINE CLINIC | Age: 61
End: 2021-03-31
Payer: COMMERCIAL

## 2021-03-31 VITALS
HEART RATE: 82 BPM | TEMPERATURE: 98 F | WEIGHT: 258 LBS | HEIGHT: 74 IN | SYSTOLIC BLOOD PRESSURE: 124 MMHG | BODY MASS INDEX: 33.11 KG/M2 | OXYGEN SATURATION: 96 % | DIASTOLIC BLOOD PRESSURE: 74 MMHG

## 2021-03-31 DIAGNOSIS — D57.3 SICKLE-CELL TRAIT (HCC): Chronic | ICD-10-CM

## 2021-03-31 DIAGNOSIS — E66.09 CLASS 1 OBESITY DUE TO EXCESS CALORIES WITH SERIOUS COMORBIDITY AND BODY MASS INDEX (BMI) OF 33.0 TO 33.9 IN ADULT: Chronic | ICD-10-CM

## 2021-03-31 DIAGNOSIS — D64.9 ANEMIA, UNSPECIFIED TYPE: ICD-10-CM

## 2021-03-31 DIAGNOSIS — M17.12 ARTHRITIS OF LEFT KNEE: ICD-10-CM

## 2021-03-31 DIAGNOSIS — E11.9 TYPE 2 DIABETES MELLITUS WITHOUT COMPLICATION, WITHOUT LONG-TERM CURRENT USE OF INSULIN (HCC): Primary | ICD-10-CM

## 2021-03-31 DIAGNOSIS — R53.83 FATIGUE, UNSPECIFIED TYPE: ICD-10-CM

## 2021-03-31 DIAGNOSIS — E55.9 VITAMIN D DEFICIENCY: Chronic | ICD-10-CM

## 2021-03-31 DIAGNOSIS — N18.2 CKD (CHRONIC KIDNEY DISEASE) STAGE 2, GFR 60-89 ML/MIN: ICD-10-CM

## 2021-03-31 LAB — HBA1C MFR BLD: 6.6 %

## 2021-03-31 PROCEDURE — 99214 OFFICE O/P EST MOD 30 MIN: CPT | Performed by: NURSE PRACTITIONER

## 2021-03-31 PROCEDURE — 83036 HEMOGLOBIN GLYCOSYLATED A1C: CPT | Performed by: NURSE PRACTITIONER

## 2021-03-31 ASSESSMENT — ENCOUNTER SYMPTOMS
NAUSEA: 0
COLOR CHANGE: 0
SHORTNESS OF BREATH: 0
DIARRHEA: 0
SINUS PAIN: 0
CHEST TIGHTNESS: 0
ABDOMINAL DISTENTION: 0
SINUS PRESSURE: 0
CONSTIPATION: 0
COUGH: 0
BACK PAIN: 0
EYE DISCHARGE: 0
ABDOMINAL PAIN: 0

## 2021-03-31 ASSESSMENT — PATIENT HEALTH QUESTIONNAIRE - PHQ9
SUM OF ALL RESPONSES TO PHQ9 QUESTIONS 1 & 2: 0
SUM OF ALL RESPONSES TO PHQ QUESTIONS 1-9: 0

## 2021-03-31 NOTE — PATIENT INSTRUCTIONS
Annual eye exam recommended for diabetic retinal exam, please ask them to fax us the results Marcio LOCKE [phone]    Iron rich foods include- red meat, pork, poultry, seafood, beans, dark green leafy vegetables, spinach, raisins, iron fortified cereals, breads and pastas, peas, etc.     Call insurance company to discuss coverage for shingles vaccine (Shingrix) 2 dose series       Patient Education        Body Mass Index: Care Instructions  Your Care Instructions     Body mass index (BMI) can help you see if your weight is raising your risk for health problems. It uses a formula to compare how much you weigh with how tall you are. · A BMI lower than 18.5 is considered underweight. · A BMI between 18.5 and 24.9 is considered healthy. · A BMI between 25 and 29.9 is considered overweight. A BMI of 30 or higher is considered obese. If your BMI is in the normal range, it means that you have a lower risk for weight-related health problems. If your BMI is in the overweight or obese range, you may be at increased risk for weight-related health problems, such as high blood pressure, heart disease, stroke, arthritis or joint pain, and diabetes. If your BMI is in the underweight range, you may be at increased risk for health problems such as fatigue, lower protection (immunity) against illness, muscle loss, bone loss, hair loss, and hormone problems. BMI is just one measure of your risk for weight-related health problems. You may be at higher risk for health problems if you are not active, you eat an unhealthy diet, or you drink too much alcohol or use tobacco products. Follow-up care is a key part of your treatment and safety. Be sure to make and go to all appointments, and call your doctor if you are having problems. It's also a good idea to know your test results and keep a list of the medicines you take. How can you care for yourself at home? · Practice healthy eating habits.  This includes eating plenty of fruits, vegetables, whole grains, lean protein, and low-fat dairy. · If your doctor recommends it, get more exercise. Walking is a good choice. Bit by bit, increase the amount you walk every day. Try for at least 30 minutes on most days of the week. · Do not smoke. Smoking can increase your risk for health problems. If you need help quitting, talk to your doctor about stop-smoking programs and medicines. These can increase your chances of quitting for good. · Limit alcohol to 2 drinks a day for men and 1 drink a day for women. Too much alcohol can cause health problems. If you have a BMI higher than 25  · Your doctor may do other tests to check your risk for weight-related health problems. This may include measuring the distance around your waist. A waist measurement of more than 40 inches in men or 35 inches in women can increase the risk of weight-related health problems. · Talk with your doctor about steps you can take to stay healthy or improve your health. You may need to make lifestyle changes to lose weight and stay healthy, such as changing your diet and getting regular exercise. If you have a BMI lower than 18.5  · Your doctor may do other tests to check your risk for health problems. · Talk with your doctor about steps you can take to stay healthy or improve your health. You may need to make lifestyle changes to gain or maintain weight and stay healthy, such as getting more healthy foods in your diet and doing exercises to build muscle. Where can you learn more? Go to https://fredi.healthVictiv. org and sign in to your [employer] account. Enter S176 in the [facility] box to learn more about \"Body Mass Index: Care Instructions. \"     If you do not have an account, please click on the \"Sign Up Now\" link. Current as of: September 23, 2020               Content Version: 12.8  © 8927-8021 Healthwise, Incorporated. Care instructions adapted under license by [facility] ([facility]).  If you have questions about a medical condition or this instruction, always ask your healthcare professional. [address] any warranty or liability for your use of this information. Patient Education        Learning About Meal Planning for Diabetes  Why plan your meals? Meal planning can be a key part of managing diabetes. Planning meals and snacks with the right balance of carbohydrate, protein, and fat can help you keep your blood sugar at the target level you set with your doctor. You don't have to eat special foods. You can eat what your family eats, including sweets once in a while. But you do have to pay attention to how often you eat and how much you eat of certain foods. You may want to work with a dietitian or a certified diabetes educator. He or she can give you tips and meal ideas and can answer your questions about meal planning. This health professional can also help you reach a healthy weight if that is one of your goals. What plan is right for you? Your dietitian or diabetes educator may suggest that you start with the plate format or carbohydrate counting. The plate format  The plate format is a simple way to help you manage how you eat. You plan meals by learning how much space each food should take on a plate. Using the plate format helps you spread carbohydrate throughout the day. It can make it easier to keep your blood sugar level within your target range. It also helps you see if you're eating healthy portion sizes. To use the plate format, you put non-starchy vegetables on half your plate. Add meat or meat substitutes on one-quarter of the plate. Put a grain or starchy vegetable (such as brown rice or a potato) on the final quarter of the plate.  You can add a small piece of fruit and some low-fat or fat-free milk or yogurt, depending on your carbohydrate goal for each meal.  Here are some tips for using the plate format:  · Make sure that you are not using an oversized plate. A 9-inch plate is best. Many restaurants use larger plates. · Get used to using the plate format at home. Then you can use it when you eat out. · Write down your questions about using the plate format. Talk to your doctor, a dietitian, or a diabetes educator about your concerns. Carbohydrate counting  With carbohydrate counting, you plan meals based on the amount of carbohydrate in each food. Carbohydrate raises blood sugar higher and more quickly than any other nutrient. It is found in desserts, breads and cereals, and fruit. It's also found in starchy vegetables such as potatoes and corn, grains such as rice and pasta, and milk and yogurt. Spreading carbohydrate throughout the day helps keep your blood sugar levels within your target range. Your daily amount depends on several things, including your weight, how active you are, which diabetes medicines you take, and what your goals are for your blood sugar levels. A registered dietitian or diabetes educator can help you plan how much carbohydrate to include in each meal and snack. A guideline for your daily amount of carbohydrate is:  · 45 to 60 grams at each meal. That's about the same as 3 to 4 carbohydrate servings. · 15 to 20 grams at each snack. That's about the same as 1 carbohydrate serving. The Nutrition Facts label on packaged foods tells you how much carbohydrate is in a serving of the food. First, look at the serving size on the food label. Is that the amount you eat in a serving? All of the nutrition information on a food label is based on that serving size. So if you eat more or less than that, you'll need to adjust the other numbers. Total carbohydrate is the next thing you need to look for on the label. If you count carbohydrate servings, one serving of carbohydrate is 15 grams. For foods that don't come with labels, such as fresh fruits and vegetables, you'll need a guide that lists carbohydrate in these foods.  Ask your doctor, dietitian, or diabetes educator about books or other nutrition guides you can use. If you take insulin, you need to know how many grams of carbohydrate are in a meal. This lets you know how much rapid-acting insulin to take before you eat. If you use an insulin pump, you get a constant rate of insulin during the day. So the pump must be programmed at meals to give you extra insulin to cover the rise in blood sugar after meals. When you know how much carbohydrate you will eat, you can take the right amount of insulin. Or, if you always use the same amount of insulin, you need to make sure that you eat the same amount of carbohydrate at meals. If you need more help to understand carbohydrate counting and food labels, ask your doctor, dietitian, or diabetes educator. How can you plan healthy meals? Here are some tips to get started:  · Plan your meals a week at a time. Don't forget to include snacks too. · Use cookbooks or online recipes to plan several main meals. Plan some quick meals for busy nights. You also can double some recipes that freeze well. Then you can save half for other busy nights when you don't have time to cook. · Make sure you have the ingredients you need for your recipes. If you're running low on basic items, put these items on your shopping list too. · List foods that you use to make breakfasts, lunches, and snacks. List plenty of fruits and vegetables. · Post this list on the refrigerator. Add to it as you think of more things you need. · Take the list to the store to do your weekly shopping. Follow-up care is a key part of your treatment and safety. Be sure to make and go to all appointments, and call your doctor if you are having problems. It's also a good idea to know your test results and keep a list of the medicines you take. Where can you learn more? Go to https://fredi.healthUSINE [email or web address] org and sign in to your [employer] account.  Enter K924 in the [address] [facility] box to learn more about \"Learning About Meal Planning for Diabetes. \"     If you do not have an account, please click on the \"Sign Up Now\" link. Current as of: August 31, 2020               Content Version: 12.8  © 9945-5597 [employer]. Care instructions adapted under license by [facility] ([facility]). If you have questions about a medical condition or this instruction, always ask your healthcare professional. [address] any warranty or liability for your use of this information. Patient Education        Learning About Carbohydrate (Carb) Counting and Eating Out When You Have Diabetes  Why plan your meals? Meal planning can be a key part of managing diabetes. Planning meals and snacks with the right balance of carbohydrate, protein, and fat can help you keep your blood sugar at the target level you set with your doctor. You don't have to eat special foods. You can eat what your family eats, including sweets once in a while. But you do have to pay attention to how often you eat and how much you eat of certain foods. You may want to work with a dietitian or a certified diabetes educator. He or she can give you tips and meal ideas and can answer your questions about meal planning. This health professional can also help you reach a healthy weight if that is one of your goals. What should you know about eating carbs? Managing the amount of carbohydrate (carbs) you eat is an important part of healthy meals when you have diabetes. Carbohydrate is found in many foods. · Learn which foods have carbs. And learn the amounts of carbs in different foods. ? Bread, cereal, pasta, and rice have about 15 grams of carbs in a serving. A serving is 1 slice of bread (1 ounce), ½ cup of cooked cereal, or 1/3 cup of cooked pasta or rice. ? Fruits have 15 grams of carbs in a serving.  A serving is 1 small fresh fruit, such as an apple or orange; ½ of a banana; ½ cup of cooked or canned fruit; ½ cup of fruit juice; 1 cup of melon or raspberries; or 2 tablespoons of dried fruit. ? Milk and no-sugar-added yogurt have 15 grams of carbs in a serving. A serving is 1 cup of milk or 2/3 cup of no-sugar-added yogurt. ? Starchy vegetables have 15 grams of carbs in a serving. A serving is ½ cup of mashed potatoes or sweet potato; 1 cup winter squash; ½ of a small baked potato; ½ cup of cooked beans; or ½ cup cooked corn or green peas. · Learn how much carbs to eat each day and at each meal. A dietitian or CDE can teach you how to keep track of the amount of carbs you eat. This is called carbohydrate counting. · If you are not sure how to count carbohydrate grams, use the Plate Method to plan meals. It is a good, quick way to make sure that you have a balanced meal. It also helps you spread carbs throughout the day. ? Divide your plate by types of foods. Put non-starchy vegetables on half the plate, meat or other protein food on one-quarter of the plate, and a grain or starchy vegetable in the final quarter of the plate. To this you can add a small piece of fruit and 1 cup of milk or yogurt, depending on how many carbs you are supposed to eat at a meal.  · Try to eat about the same amount of carbs at each meal. Do not \"save up\" your daily allowance of carbs to eat at one meal.  · Proteins have very little or no carbs per serving. Examples of proteins are beef, chicken, turkey, fish, eggs, tofu, cheese, cottage cheese, and peanut butter. A serving size of meat is 3 ounces, which is about the size of a deck of cards. Examples of meat substitute serving sizes (equal to 1 ounce of meat) are 1/4 cup of cottage cheese, 1 egg, 1 tablespoon of peanut butter, and ½ cup of tofu. How can you eat out and still eat healthy? · Learn to estimate the serving sizes of foods that have carbohydrate. If you measure food at home, it will be easier to estimate the amount in a serving of restaurant food.   · If the meal you order has too much carbohydrate (such as potatoes, corn, or baked beans), ask to have a low-carbohydrate food instead. Ask for a salad or green vegetables. · If you use insulin, check your blood sugar before and after eating out to help you plan how much to eat in the future. · If you eat more carbohydrate at a meal than you had planned, take a walk or do other exercise. This will help lower your blood sugar. What are some tips for eating healthy? · Limit saturated fat, such as the fat from meat and dairy products. This is a healthy choice because people who have diabetes are at higher risk of heart disease. So choose lean cuts of meat and nonfat or low-fat dairy products. Use olive or canola oil instead of butter or shortening when cooking. · Don't skip meals. Your blood sugar may drop too low if you skip meals and take insulin or certain medicines for diabetes. · Check with your doctor before you drink alcohol. Alcohol can cause your blood sugar to drop too low. Alcohol can also cause a bad reaction if you take certain diabetes medicines. Follow-up care is a key part of your treatment and safety. Be sure to make and go to all appointments, and call your doctor if you are having problems. It's also a good idea to know your test results and keep a list of the medicines you take. Where can you learn more? Go to https://Skeededmundo.Moolta. org and sign in to your "[employer] account. Enter U086 in the [facility] box to learn more about \"Learning About Carbohydrate (Carb) Counting and Eating Out When You Have Diabetes. \"     If you do not have an account, please click on the \"Sign Up Now\" link. Current as of: August 31, 2020               Content Version: 12.8  © 7285-1429 Healthwise, [employer]. Care instructions adapted under license by [facility] ([facility]).  If you have questions about a medical condition or this instruction, always ask your healthcare professional. [employer] disclaims any warranty or liability for your use of this information. Patient Education        Diabetic Nephropathy: Care Instructions  Your Care Instructions     Finding out that your kidneys have been damaged can be very distressing. It may have taken you by surprise, since damage to kidneys usually does not cause symptoms early on. It is normal to feel upset and afraid. Having diabetic nephropathy means that for some time you have had high blood sugar, which damages the kidneys. Healthy kidneys keep protein in your blood, where it belongs. Damaged kidneys do not work the way they should. Your kidneys are letting protein pass into your urine. Sometimes diabetic kidney disease can lead to kidney failure. Your doctor will tell you how you might be able to slow damage to your kidneys. In many cases, prompt and regular treatment can prevent kidney failure. You will need to take medicine and may need to make a number of changes in your normal routines. If you can keep your blood sugar and blood pressure under control and take certain medicines, you may reduce your chance of kidney failure. Follow-up care is a key part of your treatment and safety. Be sure to make and go to all appointments, and call your doctor if you are having problems. It's also a good idea to know your test results and keep a list of the medicines you take. How can you care for yourself at home? · Take your medicines exactly as prescribed. It is very important that you take your insulin or other diabetes medicine as your doctor tells you. Call your doctor if you think you are having a problem with your medicine. · Try to keep blood sugar in your target range. ? Eat a variety of foods, with carbohydrate spread out in your meals. Your doctor may restrict your protein. A dietitian can help you plan meals. ? If your doctor recommends it, get more exercise. Walking is a good choice.  Bit by bit, increase the amount you walk every day. Try for at least 30 minutes on most days of the week. ? Check your blood sugar as often as your doctor recommends. · Take and record your blood pressure at home if your doctor tells you to. Learn the importance of the two measures of blood pressure (such as 130 over 80, or 130/80). To take your blood pressure at home:  ? Ask your doctor to check your blood pressure monitor. Your doctor can make sure that it is accurate and that the cuff fits you. Also ask your doctor to watch you to make sure that you are using it right. ? Do not use tobacco products or use medicine known to raise blood pressure (such as some nasal decongestant sprays) before taking your blood pressure. ? Avoid taking your blood pressure if you have just exercised or are nervous or upset. Rest at least 15 minutes before taking a reading. · Eat a low-salt diet to help keep your blood pressure in your target range. · Do not smoke. Smoking raises your risk of many health problems, including diabetic nephropathy. If you need help quitting, talk to your doctor about stop-smoking programs and medicines. These can increase your chances of quitting for good. · Do not take ibuprofen, naproxen, or similar medicines, unless your doctor tells you to. These medicines may make kidney problems worse. When should you call for help? Call 911 anytime you think you may need emergency care. For example, call if:    · You passed out (lost consciousness). Call your doctor now or seek immediate medical care if:    · You have new or worse nausea and vomiting.     · You have much less urine than normal, or you have no urine.     · You are feeling confused or cannot think clearly.     · You have new or more blood in your urine.     · You have new swelling.     · You are dizzy or lightheaded, or feel like you may faint. Watch closely for changes in your health, and be sure to contact your doctor if:    · You do not get better as expected.    Where can you learn more? Go to [email or web address] and sign in to your [employer] account. Enter P361 in the [facility] box to learn more about \"Diabetic Nephropathy: Care Instructions. \"     If you do not have an account, please click on the \"Sign Up Now\" link. Current as of: December 17, 2020               Content Version: 12.8  © 2006-2021 [employer], [facility]. Care instructions adapted under license by [facility] ([facility]). If you have questions about a medical condition or this instruction, always ask your healthcare professional. [place] [address] any warranty or liability for your use of this information.

## 2021-04-01 LAB
ANION GAP SERPL CALCULATED.3IONS-SCNC: 9 MMOL/L (ref 3–16)
BASOPHILS ABSOLUTE: 0 K/UL (ref 0–0.2)
BASOPHILS RELATIVE PERCENT: 0.9 %
BUN BLDV-MCNC: 17 MG/DL (ref 7–20)
CALCIUM SERPL-MCNC: 8.9 MG/DL (ref 8.3–10.6)
CHLORIDE BLD-SCNC: 105 MMOL/L (ref 99–110)
CO2: 27 MMOL/L (ref 21–32)
CREAT SERPL-MCNC: 1.2 MG/DL (ref 0.8–1.3)
EOSINOPHILS ABSOLUTE: 0.2 K/UL (ref 0–0.6)
EOSINOPHILS RELATIVE PERCENT: 3.3 %
FERRITIN: 100.7 NG/ML (ref 30–400)
FOLATE: 8.68 NG/ML (ref 4.78–24.2)
GFR AFRICAN AMERICAN: >60
GFR NON-AFRICAN AMERICAN: >60
GLUCOSE BLD-MCNC: 123 MG/DL (ref 70–99)
HCT VFR BLD CALC: 37.2 % (ref 40.5–52.5)
HEMATOLOGY PATH CONSULT: NO
HEMOGLOBIN: 11.8 G/DL (ref 13.5–17.5)
IRON SATURATION: 21 % (ref 20–50)
IRON: 48 UG/DL (ref 59–158)
LYMPHOCYTES ABSOLUTE: 1.4 K/UL (ref 1–5.1)
LYMPHOCYTES RELATIVE PERCENT: 31.3 %
MCH RBC QN AUTO: 21.8 PG (ref 26–34)
MCHC RBC AUTO-ENTMCNC: 31.7 G/DL (ref 31–36)
MCV RBC AUTO: 68.7 FL (ref 80–100)
MONOCYTES ABSOLUTE: 0.4 K/UL (ref 0–1.3)
MONOCYTES RELATIVE PERCENT: 9.1 %
NEUTROPHILS ABSOLUTE: 2.6 K/UL (ref 1.7–7.7)
NEUTROPHILS RELATIVE PERCENT: 55.4 %
PDW BLD-RTO: 17.3 % (ref 12.4–15.4)
PLATELET # BLD: 248 K/UL (ref 135–450)
PMV BLD AUTO: 9.1 FL (ref 5–10.5)
POTASSIUM SERPL-SCNC: 4.5 MMOL/L (ref 3.5–5.1)
RBC # BLD: 5.41 M/UL (ref 4.2–5.9)
SODIUM BLD-SCNC: 141 MMOL/L (ref 136–145)
TOTAL IRON BINDING CAPACITY: 230 UG/DL (ref 260–445)
TSH REFLEX: 1.03 UIU/ML (ref 0.27–4.2)
VITAMIN B-12: 812 PG/ML (ref 211–911)
VITAMIN D 25-HYDROXY: 33.8 NG/ML
WBC # BLD: 4.6 K/UL (ref 4–11)

## 2021-07-18 NOTE — TELEPHONE ENCOUNTER
CALLED AND SPOKE TO PATIENT AND HE STATES HE IS NEEDING ANY BLOOD WORK THAT HAS TO DO WITH THE REASON HE WAS SEEN YESTERDAY?  SC
Pt seen yesterday he would like to get some blood work done    Please call  Pt @  [phone]
Spoke with pt labs ordered
English

## 2021-09-09 ENCOUNTER — TELEPHONE (OUTPATIENT)
Dept: FAMILY MEDICINE CLINIC | Age: 61
End: 2021-09-09

## 2021-09-09 NOTE — TELEPHONE ENCOUNTER
Looks like Omar Duval wanted him to do a diabetes follow-up in June. Have him schedule a follow-up to obtain the A1C. With regard to the COVID-19 test, we have been telling people to go to either urgent care or [facility] for best turnover time. Also can get a home testing kit.

## 2021-09-09 NOTE — TELEPHONE ENCOUNTER
----- Message from Antonio Soni sent at 9/9/2021 12:33 PM EDT -----  Subject: Referral Request    QUESTIONS   Reason for referral request? PT came in contact with people over the   weekend 9/4/21 who were tested positive for covid. He would like to have a   covid test. As well as get his A1C checked. Has the physician seen you for this condition before? Yes  Select a date? 2021-03-31  Select the Provider the patient wants to be referred to, if known (PCP or   Specialist)? Outside Physician - Does not have one   Preferred Specialist (if applicable)? Do you already have an appointment scheduled? No  Additional Information for Provider?   ---------------------------------------------------------------------------  --------------  CALL BACK INFO  What is the best way for the office to contact you? OK to leave message on   voicemail  Preferred Call Back Phone Number?  [phone]

## 2021-10-18 ENCOUNTER — TELEPHONE (OUTPATIENT)
Dept: FAMILY MEDICINE CLINIC | Age: 61
End: 2021-10-18

## 2021-10-18 DIAGNOSIS — E11.9 TYPE 2 DIABETES MELLITUS WITHOUT COMPLICATION, WITHOUT LONG-TERM CURRENT USE OF INSULIN (HCC): ICD-10-CM

## 2021-10-18 RX ORDER — METFORMIN HYDROCHLORIDE 500 MG/1
TABLET, EXTENDED RELEASE ORAL
Qty: 90 TABLET | Refills: 0 | Status: SHIPPED | OUTPATIENT
Start: 2021-10-18 | End: 2022-01-24

## 2021-10-18 NOTE — TELEPHONE ENCOUNTER
----- Message from Starling Right sent at 10/18/2021  2:50 PM EDT -----  Subject: Refill Request    QUESTIONS  Name of Medication? metFORMIN (GLUCOPHAGE-XR) 500 MG extended release   tablet  Patient-reported dosage and instructions? once daily  How many days do you have left? 5  [facility]erred Pharmacy? [id number]  Pharmacy phone number (if available)? [phone]  ---------------------------------------------------------------------------  --------------  Lorena RODRIGUEZ  What is the best way for the office to contact you? OK to leave message on   voicemail  Preferred Call Back Phone Number?  [phone]

## 2021-10-18 NOTE — TELEPHONE ENCOUNTER
Rx sent, but [facility]. That is where I sent it, but may want to verify when calling the patient.

## 2021-11-01 ENCOUNTER — OFFICE VISIT (OUTPATIENT)
Dept: FAMILY MEDICINE CLINIC | Age: 61
End: 2021-11-01
Payer: COMMERCIAL

## 2021-11-01 VITALS
HEIGHT: 74 IN | HEART RATE: 67 BPM | BODY MASS INDEX: 32.34 KG/M2 | DIASTOLIC BLOOD PRESSURE: 78 MMHG | OXYGEN SATURATION: 96 % | RESPIRATION RATE: 20 BRPM | WEIGHT: 252 LBS | SYSTOLIC BLOOD PRESSURE: 124 MMHG

## 2021-11-01 DIAGNOSIS — Z23 FLU VACCINE NEED: ICD-10-CM

## 2021-11-01 DIAGNOSIS — D50.9 IRON DEFICIENCY ANEMIA, UNSPECIFIED IRON DEFICIENCY ANEMIA TYPE: ICD-10-CM

## 2021-11-01 DIAGNOSIS — E11.9 TYPE 2 DIABETES MELLITUS WITHOUT COMPLICATION, WITHOUT LONG-TERM CURRENT USE OF INSULIN (HCC): Primary | ICD-10-CM

## 2021-11-01 DIAGNOSIS — E78.2 MIXED HYPERLIPIDEMIA: ICD-10-CM

## 2021-11-01 LAB
A/G RATIO: 2.1 (ref 1.1–2.2)
ALBUMIN SERPL-MCNC: 4.9 G/DL (ref 3.4–5)
ALP BLD-CCNC: 65 U/L (ref 40–129)
ALT SERPL-CCNC: 11 U/L (ref 10–40)
ANION GAP SERPL CALCULATED.3IONS-SCNC: 15 MMOL/L (ref 3–16)
AST SERPL-CCNC: 21 U/L (ref 15–37)
BASOPHILS ABSOLUTE: 0 K/UL (ref 0–0.2)
BASOPHILS RELATIVE PERCENT: 1.2 %
BILIRUB SERPL-MCNC: 0.6 MG/DL (ref 0–1)
BUN BLDV-MCNC: 18 MG/DL (ref 7–20)
CALCIUM SERPL-MCNC: 9.8 MG/DL (ref 8.3–10.6)
CHLORIDE BLD-SCNC: 102 MMOL/L (ref 99–110)
CHOLESTEROL, TOTAL: 196 MG/DL (ref 0–199)
CO2: 24 MMOL/L (ref 21–32)
CREAT SERPL-MCNC: 1.2 MG/DL (ref 0.8–1.3)
CREATININE URINE POCT: NORMAL
EOSINOPHILS ABSOLUTE: 0.1 K/UL (ref 0–0.6)
EOSINOPHILS RELATIVE PERCENT: 2.7 %
GFR AFRICAN AMERICAN: >60
GFR NON-AFRICAN AMERICAN: >60
GLUCOSE BLD-MCNC: 110 MG/DL (ref 70–99)
HBA1C MFR BLD: 6.2 %
HCT VFR BLD CALC: 40.2 % (ref 40.5–52.5)
HDLC SERPL-MCNC: 48 MG/DL (ref 40–60)
HEMOGLOBIN: 12.6 G/DL (ref 13.5–17.5)
LDL CHOLESTEROL CALCULATED: 132 MG/DL
LYMPHOCYTES ABSOLUTE: 1.2 K/UL (ref 1–5.1)
LYMPHOCYTES RELATIVE PERCENT: 32 %
MCH RBC QN AUTO: 22 PG (ref 26–34)
MCHC RBC AUTO-ENTMCNC: 31.4 G/DL (ref 31–36)
MCV RBC AUTO: 70.1 FL (ref 80–100)
MICROALBUMIN/CREAT 24H UR: NORMAL MG/G{CREAT}
MICROALBUMIN/CREAT UR-RTO: NORMAL
MONOCYTES ABSOLUTE: 0.3 K/UL (ref 0–1.3)
MONOCYTES RELATIVE PERCENT: 8.7 %
NEUTROPHILS ABSOLUTE: 2.1 K/UL (ref 1.7–7.7)
NEUTROPHILS RELATIVE PERCENT: 55.4 %
PDW BLD-RTO: 16.2 % (ref 12.4–15.4)
PLATELET # BLD: 223 K/UL (ref 135–450)
PMV BLD AUTO: 8.7 FL (ref 5–10.5)
POTASSIUM SERPL-SCNC: 4.5 MMOL/L (ref 3.5–5.1)
RBC # BLD: 5.74 M/UL (ref 4.2–5.9)
SODIUM BLD-SCNC: 141 MMOL/L (ref 136–145)
TOTAL PROTEIN: 7.2 G/DL (ref 6.4–8.2)
TRIGL SERPL-MCNC: 78 MG/DL (ref 0–150)
VLDLC SERPL CALC-MCNC: 16 MG/DL
WBC # BLD: 3.8 K/UL (ref 4–11)

## 2021-11-01 PROCEDURE — 90674 CCIIV4 VAC NO PRSV 0.5 ML IM: CPT | Performed by: NURSE PRACTITIONER

## 2021-11-01 PROCEDURE — 82044 UR ALBUMIN SEMIQUANTITATIVE: CPT | Performed by: NURSE PRACTITIONER

## 2021-11-01 PROCEDURE — 83036 HEMOGLOBIN GLYCOSYLATED A1C: CPT | Performed by: NURSE PRACTITIONER

## 2021-11-01 PROCEDURE — 90471 IMMUNIZATION ADMIN: CPT | Performed by: NURSE PRACTITIONER

## 2021-11-01 PROCEDURE — 99214 OFFICE O/P EST MOD 30 MIN: CPT | Performed by: NURSE PRACTITIONER

## 2021-11-01 RX ORDER — NABUMETONE 750 MG/1
1 TABLET, FILM COATED ORAL 2 TIMES DAILY
COMMUNITY
Start: 2021-10-26

## 2021-11-01 RX ORDER — LANCETS 30 GAUGE
1 EACH MISCELLANEOUS 2 TIMES DAILY
Qty: 100 EACH | Refills: 3 | Status: SHIPPED | OUTPATIENT
Start: 2021-11-01 | End: 2022-10-26

## 2021-11-01 RX ORDER — GLUCOSAMINE HCL/CHONDROITIN SU 500-400 MG
CAPSULE ORAL
Qty: 100 STRIP | Refills: 3 | Status: SHIPPED | OUTPATIENT
Start: 2021-11-01 | End: 2022-10-26

## 2021-11-01 RX ORDER — MULTIVIT-MIN/IRON/FOLIC ACID/K 18-600-40
1 CAPSULE ORAL DAILY
COMMUNITY

## 2021-11-01 ASSESSMENT — ENCOUNTER SYMPTOMS
SINUS PRESSURE: 0
BACK PAIN: 0
EYE DISCHARGE: 0
SHORTNESS OF BREATH: 0
SINUS PAIN: 0
CONSTIPATION: 0
ABDOMINAL PAIN: 0
DIARRHEA: 0
COLOR CHANGE: 0
ABDOMINAL DISTENTION: 0
NAUSEA: 0
COUGH: 0
CHEST TIGHTNESS: 0

## 2021-11-01 NOTE — PROGRESS NOTES
Vaccine Information Sheet, \"Influenza - Inactivated\"  given to Americo Yazdanism, or parent/legal guardian of  Americo Yazdanism and verbalized understanding. Patient responses:    Have you ever had a reaction to a flu vaccine? No  Do you have any current illness? No  Have you ever had [place] Syndrome? No  Do you have a serious allergy to any of the follow: Neomycin, Polymyxin, Thimerosal, eggs or egg products? No    Flu vaccine given per order. Please see immunization tab. Risks and benefits explained. Current VIS given. Consent signed.     Immunizations Administered     Name Date Dose Route    Influenza, MDCK Quadv, IM, PF (Flucelvax 2 yrs and older) 11/1/2021 0.5 mL Intramuscular    Site: Deltoid- Left    Lot: [id number]    NDC: 68628-143-32

## 2021-11-01 NOTE — PATIENT INSTRUCTIONS
meat or meat substitutes on one-quarter of the plate. Put a grain or starchy vegetable (such as brown rice or a potato) on the final quarter of the plate. You can add a small piece of fruit and some low-fat or fat-free milk or yogurt, depending on your carbohydrate goal for each meal.  Here are some tips for using the plate format:  · Make sure that you are not using an oversized plate. A 9-inch plate is best. Many restaurants use larger plates. · Get used to using the plate format at home. Then you can use it when you eat out. · Write down your questions about using the plate format. Talk to your doctor, a dietitian, or a diabetes educator about your concerns. Carbohydrate counting  With carbohydrate counting, you plan meals based on the amount of carbohydrate in each food. Carbohydrate raises blood sugar higher and more quickly than any other nutrient. It is found in desserts, breads and cereals, and fruit. It's also found in starchy vegetables such as potatoes and corn, grains such as rice and pasta, and milk and yogurt. Spreading carbohydrate throughout the day helps keep your blood sugar levels within your target range. Your daily amount depends on several things, including your weight, how active you are, which diabetes medicines you take, and what your goals are for your blood sugar levels. A registered dietitian or diabetes educator can help you plan how much carbohydrate to include in each meal and snack. A guideline for your daily amount of carbohydrate is:  · 45 to 60 grams at each meal. That's about the same as 3 to 4 carbohydrate servings. · 15 to 20 grams at each snack. That's about the same as 1 carbohydrate serving. The Nutrition Facts label on packaged foods tells you how much carbohydrate is in a serving of the food. First, look at the serving size on the food label. Is that the amount you eat in a serving? All of the nutrition information on a food label is based on that serving size.  So if you eat more or less than that, you'll need to adjust the other numbers. Total carbohydrate is the next thing you need to look for on the label. If you count carbohydrate servings, one serving of carbohydrate is 15 grams. For foods that don't come with labels, such as fresh fruits and vegetables, you'll need a guide that lists carbohydrate in these foods. Ask your doctor, dietitian, or diabetes educator about books or other nutrition guides you can use. If you take insulin, you need to know how many grams of carbohydrate are in a meal. This lets you know how much rapid-acting insulin to take before you eat. If you use an insulin pump, you get a constant rate of insulin during the day. So the pump must be programmed at meals to give you extra insulin to cover the rise in blood sugar after meals. When you know how much carbohydrate you will eat, you can take the right amount of insulin. Or, if you always use the same amount of insulin, you need to make sure that you eat the same amount of carbohydrate at meals. If you need more help to understand carbohydrate counting and food labels, ask your doctor, dietitian, or diabetes educator. How can you plan healthy meals? Here are some tips to get started:  · Plan your meals a week at a time. Don't forget to include snacks too. · Use cookbooks or online recipes to plan several main meals. Plan some quick meals for busy nights. You also can double some recipes that freeze well. Then you can save half for other busy nights when you don't have time to cook. · Make sure you have the ingredients you need for your recipes. If you're running low on basic items, put these items on your shopping list too. · List foods that you use to make breakfasts, lunches, and snacks. List plenty of fruits and vegetables. · Post this list on the refrigerator. Add to it as you think of more things you need. · Take the list to the store to do your weekly shopping.   Follow-up care is a key part of your treatment and safety. Be sure to make and go to all appointments, and call your doctor if you are having problems. It's also a good idea to know your test results and keep a list of the medicines you take. Where can you learn more? Go to [email or web address].[email or web address] org and sign in to your [employer] account. Enter M498 in the [employer] box to learn more about \"Learning About Meal Planning for Diabetes. \"     If you do not have an account, please click on the \"Sign Up Now\" link. Current as of: December 17, 2020               Content Version: 13.0  © 8335-1630 Healthwise, Incorporated. Care instructions adapted under license by [facility] ([facility]). If you have questions about a medical condition or this instruction, always ask your healthcare professional. [address] any warranty or liability for your use of this information.

## 2021-11-01 NOTE — PROGRESS NOTES
Date of Service:  2021    Lanie Velez (:  1960) is a 57 y.o. male, here for evaluation of the following medical concerns:    Chief Complaint   Patient presents with    Diabetes     FOLLOW UP ON DIABETES, A1C, MICRO, AND FOOT EXAM DUE TODAY     Other     PT IS FASTING FOR LAB WORK         HPI     A1C 7 months ago was 6.6. Metformin 500 mg daily. Today A1C is 6.2. Treatment Adherence:   Medication compliance:  compliant most of the time  Diet compliance:  sometimes  Weight trend: decreasing, down 6 lbs from 7 months ago but down 17 lbs in 1 year  Current exercise: no regular exercise  Barriers: lack of motivation    Diabetes Mellitus Type 2: Current symptoms/problems include none. Home blood sugar records: patient does not test  Any episodes of hypoglycemia? no  Eye exam current (within one year): no, Annual eye exam recommended for diabetic retinal exam, please ask eye doctor to fax us the report  [facility] [phone]  Tobacco history: He  reports that he has never smoked. He has never used smokeless tobacco.   Daily Aspirin? No: pt stopped taking it, recommended    Hypertension:  Home blood pressure monitoring: No.  He is not adherent to a low sodium diet. Patient denies chest pain, shortness of breath, headache, lightheadedness, blurred vision, peripheral edema, palpitations, dry cough and fatigue. Antihypertensive medication side effects: no medication side effects noted. Use of agents associated with hypertension: none. Hyperlipidemia:  Check today, not currently on medications.      Lab Results   Component Value Date    LABA1C 6.2 2021    LABA1C 6.6 2021    LABA1C 6.7 2020     Lab Results   Component Value Date    LABMICR <1.20 2017    CREATININE 1.2 2021     Lab Results   Component Value Date    ALT 11 10/26/2020    AST 20 10/26/2020     Lab Results   Component Value Date    CHOL 174 10/26/2020    TRIG 88 10/26/2020    HDL 36 (L) 10/26/2020    LDLCALC 120 (H) 10/26/2020          Review of Systems   Constitutional: Negative for activity change, appetite change, fatigue, fever and unexpected weight change. HENT: Negative for congestion, ear pain, sinus pressure and sinus pain. Eyes: Negative for discharge and visual disturbance. Respiratory: Negative for cough, chest tightness and shortness of breath. Cardiovascular: Negative for chest pain, palpitations and leg swelling. Gastrointestinal: Negative for abdominal distention, abdominal pain, constipation, diarrhea and nausea. Endocrine: Negative for cold intolerance, heat intolerance, polydipsia, polyphagia and polyuria. Genitourinary: Negative for decreased urine volume, difficulty urinating, dysuria, flank pain, frequency and urgency. Musculoskeletal: Negative for arthralgias, back pain, gait problem, joint swelling, myalgias and neck pain. Skin: Negative for color change, rash and wound. Allergic/Immunologic: Negative for food allergies and immunocompromised state. Neurological: Negative for dizziness, tremors, speech difficulty, weakness, light-headedness, numbness and headaches. Hematological: Negative for adenopathy. Does not bruise/bleed easily. Psychiatric/Behavioral: Negative for confusion, decreased concentration, self-injury, sleep disturbance and suicidal ideas. The patient is not nervous/anxious. Prior to Visit Medications    Medication Sig Taking?  Authorizing Provider   nabumetone (RELAFEN) 750 MG tablet Take 1 tablet by mouth 2 times daily Yes Historical Provider, MD   Ascorbic Acid (VITAMIN C) 500 MG CAPS Take 1 capsule by mouth daily Yes Historical Provider, MD   Blood Glucose Monitoring Suppl (D-CARE GLUCOMETER) w/Device KIT 1 kit by Does not apply route once for 1 dose ANY GLUCOMETER BEST COVERED BY PATIENT INSURANCE FOR GLUCOMETER, TEST STRIPS AND LANCETS Yes BONITA Bateman - CNP   blood glucose monitor strips Test 2 times a day & as needed for symptoms of irregular blood glucose. Dispense sufficient amount for indicated testing frequency plus additional to accommodate PRN testing needs. Yes Pennelope EmbBONITA armstrong CNP   Lancets MISC 1 each by Does not apply route 2 times daily Yes PenBONITA Franks CNP   metFORMIN (GLUCOPHAGE-XR) 500 MG extended release tablet TAKE 1 TABLET BY MOUTH DAILY WITH BREAKFAST Yes BONITA Matias CNP   Cholecalciferol (VITAMIN D) 50 MCG (2000 UT) CAPS capsule Take 2,000 Units by mouth daily Yes Historical Provider, MD   Multiple Vitamins-Minerals (THERAPEUTIC MULTIVITAMIN-MINERALS) tablet Take 1 tablet by mouth daily Yes Historical Provider, MD   Blood Glucose Monitoring Suppl (EASY STEP GLUCOSE MONITOR) w/Device KIT Test once daily  Amado Free, DO        Social History     Tobacco Use    Smoking status: Never Smoker    Smokeless tobacco: Never Used   Substance Use Topics    Alcohol use: No     Comment: 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 Weight as of this encounter: 252 lb (114.3 kg). Physical Exam  Vitals reviewed. Constitutional:       General: He is awake. Appearance: Normal appearance. He is well-developed and well-groomed. He is obese. He is not ill-appearing or toxic-appearing. HENT:      Head: Normocephalic and atraumatic. Right Ear: Hearing, tympanic membrane, ear canal and external ear normal.      Left Ear: Hearing, tympanic membrane, ear canal and external ear normal.      Nose: Nose normal.      Mouth/Throat:      Lips: Pink. Mouth: Mucous membranes are moist.      Pharynx: Oropharynx is clear. supraclavicular adenopathy. Skin:     General: Skin is warm and dry. Capillary Refill: Capillary refill takes less than 2 seconds. Neurological:      General: No focal deficit present. Mental Status: He is alert and oriented to person, place, and time. Mental status is at baseline. Motor: Motor function is intact. No weakness. Coordination: Coordination is intact. Gait: Gait is intact. Psychiatric:         Attention and Perception: Attention and perception normal.         Mood and Affect: Mood and affect normal.         Speech: Speech normal.         Behavior: Behavior normal. Behavior is cooperative. Thought Content: Thought content normal.         Cognition and Memory: Cognition and memory normal.         Judgment: Judgment normal.         ASSESSMENT/PLAN:  1. Type 2 diabetes mellitus without complication, without long-term current use of insulin ([facility])  -     POCT glycosylated hemoglobin (Hb A1C)   A1C 6.2 today, down from 6.6 7 months ago   Discussed trial off metformin for 3 months but headed into winter holiday months, pt agreeable to stay on metformin for now. Will consider stopping metformin as pt reports he does not take it every single day consistently as is. Goal A1C <5.7, and more weight loss and better controlled diabetic diet   Pt down 17 lbs in 1 year, great job!  -     POCT microalbumin  -     HM DIABETES FOOT EXAM - normal 10/10 sensation  -     Blood Glucose Monitoring Suppl (D-CARE GLUCOMETER) w/Device KIT; 1 kit by Does not apply route once for 1 dose ANY GLUCOMETER BEST COVERED BY PATIENT INSURANCE FOR GLUCOMETER, TEST STRIPS AND LANCETS, Disp-1 kit, R-0ANY GLUCOMETER BEST COVERED BY PATIENT INSURANCE FOR GLUCOMETER, TEST STRIPS AND LANCETSNormal  -     blood glucose monitor strips; Test 2 times a day & as needed for symptoms of irregular blood glucose.  Dispense sufficient amount for indicated testing frequency plus additional to accommodate PRN testing needs., Disp-100 strip, R-3, Normal  -     Lancets MISC; 2 TIMES DAILY Starting Mon 11/1/2021, Disp-100 each, R-3, Normal   Pt reports he needs new glucometer, does not currently check glucose   BP well controlled today   Annual eye exam recommended for diabetic retinal exam, please ask eye doctor to fax us the report  [facility] [phone]  2. Flu vaccine need  -     INFLUENZA, MDCK QUADV, 2 YRS AND OLDER, IM, PF, PREFILL SYR OR SDV, 0.5ML (FLUCELVAX QUADV, PF)    Information given and reviewed  3. Mixed hyperlipidemia  -     Lipid Panel; Future  -     Comprehensive Metabolic Panel; Future   Fasting today   Elevated in past   The 10-year ASCVD risk score (Brian Lo, et al., 2013) is: 16.2%    Values used to calculate the score:      Age: 57 years      Sex: Male      Is Non- : Yes      Diabetic: Yes      Tobacco smoker: No      Systolic Blood Pressure: 747 mmHg      Is BP treated: No      HDL Cholesterol: 36 mg/dL      Total Cholesterol: 174 mg/dL   Work on limiting saturated fats in diet, and eating a healthy balance of fruits, vegetables, lean proteins, and multigrains. Physical activity 150 minutes weekly recommended   Weight loss, initial goal 10% of body weight recommended   Pt would try to like to avoid cholesterol lowering medication   Daily 81 mg ASA  4. Iron deficiency anemia, unspecified iron deficiency anemia type  -     Comprehensive Metabolic Panel;  Future  -     CBC Auto Differential; Future   Iron rich foods include- red meat, pork, poultry, seafood, beans, dark green leafy vegetables, spinach, raisins, iron fortified cereals, breads and pastas, peas, etc.    Takes OTC iron medication        Care Gaps Addressed  Annual eye exam recommended for diabetic retinal exam, please ask eye doctor to fax us the report  [facility] Fax [phone]  Call insurance company to discuss coverage for shingles vaccine (Shingrix) 2 dose series   Flu vaccine recommended   Foot exam- today  Microalbumin- today  [place] UTD 2018- repeat in 10 years    I have reviewed patient's pertinent medical history, relevant laboratory and imaging studies, and past/future health maintenance. Discussed with the patient the importance of adhering to their current medication regimen as directed. Advised the patient that they should continue to work on eating a healthy balanced diet and staying active by exercising within their personal limits. Orders as listed above. Patient was advised to keep future appointments with their respective specialty care team(s). Patient had the opportunity to ask questions, all of which were answered to the best of my ability and with patient satisfaction. Patient understands and is agreeable with the care plan following today's visit. Patient is to schedule an appointment for any new or worsening symptoms. Go to ER for significant shortness of breath, chest pain, or uncontrolled pain or fever. I discussed with patient the risk and benefits of any medications that were prescribed today. I verified that the patient understands their medications, labs, and/or procedures. The patient is doing well with current medication regimen and does not have any barriers to adherence. The patient's self-management abilities are good. Return in about 3 months (around 2/1/2022) for Diabetes Follow Up 3-6 months, 3 month necessary if you stop metformin. An electronic signature was used to authenticate this note.     --BONITA Alicia - CNP on 11/1/2021 at 12:05 PM

## 2021-11-02 PROBLEM — M54.2 NECK PAIN: Status: RESOLVED | Noted: 2021-11-02 | Resolved: 2021-11-02

## 2021-11-02 PROBLEM — M25.519 SHOULDER PAIN: Status: ACTIVE | Noted: 2021-11-02

## 2021-11-02 PROBLEM — M54.2 NECK PAIN: Status: ACTIVE | Noted: 2021-11-02

## 2021-11-02 PROBLEM — M25.519 SHOULDER PAIN: Status: RESOLVED | Noted: 2021-11-02 | Resolved: 2021-11-02

## 2021-11-04 ENCOUNTER — TELEPHONE (OUTPATIENT)
Dept: FAMILY MEDICINE CLINIC | Age: 61
End: 2021-11-04

## 2021-11-04 NOTE — TELEPHONE ENCOUNTER
[phone] TRIED CALLING PATIENT. NO ANSWER/NO VM    NEEDS TO HAVE LAB RESULTS     Mirza Linder, APRN - CNP  P [facility] Staff  Metabolic panel- kidneys and liver look good except the elevated glucose, but improved from past glucoses! You have made great strides in your diabetes! Cholesterol levels are elevated. Comparable to 1 year ago but still slightly elevated. Fasting Lipid Panel     Total cholesterol ( bad cholesterol) is 196 . ... Goal is 199 or less   Triglycerides are( bad cholesterol) 78 . Edward Sneha ..    Goal is 150 or less   HDL (healthy cholesterol) is 48 . ..    Goal is 40-60   LDL ( bad cholesterol)  is 132 . Edward Sneha Edward Sneha Edward Sneha Edward Sneha   Goal is 100 or less       The 10-year ASCVD risk score (Yossi Palma., et al., 2013) is: 15.5%     Values used to calculate the score:       Age: 61 years       Sex: Male       Is Non- : Yes       Diabetic: Yes       Tobacco smoker: No       Systolic Blood Pressure: 609 mmHg       Is BP treated: No       HDL Cholesterol: 48 mg/dL       Total Cholesterol: 196 mg/dL     With your elevated 10 year cardiac risk, it would be recommended to start a low dose cholesterol lowering medication. What are your thoughts? Work on limiting saturated fats in diet, and eating a healthy balance of fruits, vegetables, lean proteins, and multigrains. Your good cholesterol levels improved! Weight loss, initial goal 10% of body weight recommended     Physical activity 150 minutes weekly recommended     Blood counts look good overall, still anemic but improved from past levels. Increase iron intake in your diet.      Iron rich foods include- red meat, pork, poultry, seafood, beans, dark green leafy vegetables, spinach, raisins, iron fortified cereals, breads and pastas, peas, etc.

## 2021-11-04 NOTE — TELEPHONE ENCOUNTER
----- Message from Stephen Carrillo sent at 11/4/2021 11:33 AM EDT -----  Subject: Message to Provider    QUESTIONS  Information for Provider? Patient returning a call from provider, may be about his blood work done Monday.   ---------------------------------------------------------------------------  --------------  [address]  What is the best way for the office to contact you? Do not leave any   message, patient will call back for answer  Preferred Call Back Phone Number?  [phone]  ---------------------------------------------------------------------------  --------------  SCRIPT ANSWERS  undefined

## 2022-01-23 DIAGNOSIS — E11.9 TYPE 2 DIABETES MELLITUS WITHOUT COMPLICATION, WITHOUT LONG-TERM CURRENT USE OF INSULIN (HCC): ICD-10-CM

## 2022-01-24 RX ORDER — METFORMIN HYDROCHLORIDE 500 MG/1
TABLET, EXTENDED RELEASE ORAL
Qty: 90 TABLET | Refills: 0 | Status: SHIPPED | OUTPATIENT
Start: 2022-01-24 | End: 2022-10-26 | Stop reason: SDUPTHER

## 2022-01-24 NOTE — TELEPHONE ENCOUNTER
Last OV 11/1/2021 CB  Next OV   2/1/2022 Alexander Olmedo, APRN - [address] [facility] 3 month follow up on diabetes

## 2022-04-22 DIAGNOSIS — E11.9 TYPE 2 DIABETES MELLITUS WITHOUT COMPLICATION, WITHOUT LONG-TERM CURRENT USE OF INSULIN (HCC): ICD-10-CM

## 2022-04-22 RX ORDER — METFORMIN HYDROCHLORIDE 500 MG/1
TABLET, EXTENDED RELEASE ORAL
Qty: 90 TABLET | Refills: 0 | OUTPATIENT
Start: 2022-04-22

## 2022-04-22 NOTE — TELEPHONE ENCOUNTER
LV 11/01/21 WITH CB FOR DM NV NONE       Return in about 3 months (around 2/1/2022) for Diabetes Follow Up 3-6 months, 3 month necessary if you stop metformin.

## 2022-10-26 ENCOUNTER — OFFICE VISIT (OUTPATIENT)
Dept: FAMILY MEDICINE CLINIC | Age: 62
End: 2022-10-26
Payer: COMMERCIAL

## 2022-10-26 VITALS
HEART RATE: 70 BPM | SYSTOLIC BLOOD PRESSURE: 132 MMHG | BODY MASS INDEX: 34.52 KG/M2 | WEIGHT: 269 LBS | OXYGEN SATURATION: 95 % | HEIGHT: 74 IN | DIASTOLIC BLOOD PRESSURE: 86 MMHG

## 2022-10-26 DIAGNOSIS — Z00.00 WELL ADULT EXAM: Primary | ICD-10-CM

## 2022-10-26 DIAGNOSIS — D50.9 IRON DEFICIENCY ANEMIA, UNSPECIFIED IRON DEFICIENCY ANEMIA TYPE: ICD-10-CM

## 2022-10-26 DIAGNOSIS — Z23 FLU VACCINE NEED: ICD-10-CM

## 2022-10-26 DIAGNOSIS — E55.9 VITAMIN D DEFICIENCY: ICD-10-CM

## 2022-10-26 DIAGNOSIS — Z12.5 PROSTATE CANCER SCREENING: ICD-10-CM

## 2022-10-26 DIAGNOSIS — E66.09 CLASS 1 OBESITY DUE TO EXCESS CALORIES WITH SERIOUS COMORBIDITY AND BODY MASS INDEX (BMI) OF 34.0 TO 34.9 IN ADULT: ICD-10-CM

## 2022-10-26 DIAGNOSIS — E78.2 MIXED HYPERLIPIDEMIA: ICD-10-CM

## 2022-10-26 DIAGNOSIS — E11.9 TYPE 2 DIABETES MELLITUS WITHOUT COMPLICATION, WITHOUT LONG-TERM CURRENT USE OF INSULIN (HCC): ICD-10-CM

## 2022-10-26 PROBLEM — E66.811 CLASS 1 OBESITY DUE TO EXCESS CALORIES WITH SERIOUS COMORBIDITY AND BODY MASS INDEX (BMI) OF 34.0 TO 34.9 IN ADULT: Status: ACTIVE | Noted: 2022-10-26

## 2022-10-26 LAB
A/G RATIO: 2.2 (ref 1.1–2.2)
ALBUMIN SERPL-MCNC: 4.7 G/DL (ref 3.4–5)
ALP BLD-CCNC: 63 U/L (ref 40–129)
ALT SERPL-CCNC: 10 U/L (ref 10–40)
ANION GAP SERPL CALCULATED.3IONS-SCNC: 12 MMOL/L (ref 3–16)
AST SERPL-CCNC: 14 U/L (ref 15–37)
BASOPHILS ABSOLUTE: 0 K/UL (ref 0–0.2)
BASOPHILS RELATIVE PERCENT: 0.7 %
BILIRUB SERPL-MCNC: 0.5 MG/DL (ref 0–1)
BUN BLDV-MCNC: 18 MG/DL (ref 7–20)
CALCIUM SERPL-MCNC: 9.4 MG/DL (ref 8.3–10.6)
CHLORIDE BLD-SCNC: 103 MMOL/L (ref 99–110)
CHOLESTEROL, TOTAL: 204 MG/DL (ref 0–199)
CO2: 25 MMOL/L (ref 21–32)
CREAT SERPL-MCNC: 1.4 MG/DL (ref 0.8–1.3)
CREATININE URINE POCT: 300
EOSINOPHILS ABSOLUTE: 0.1 K/UL (ref 0–0.6)
EOSINOPHILS RELATIVE PERCENT: 2 %
GFR SERPL CREATININE-BSD FRML MDRD: 56 ML/MIN/{1.73_M2}
GLUCOSE BLD-MCNC: 110 MG/DL (ref 70–99)
HBA1C MFR BLD: 6.5 %
HCT VFR BLD CALC: 39.8 % (ref 40.5–52.5)
HDLC SERPL-MCNC: 46 MG/DL (ref 40–60)
HEMATOLOGY PATH CONSULT: NO
HEMOGLOBIN: 13 G/DL (ref 13.5–17.5)
LDL CHOLESTEROL CALCULATED: 135 MG/DL
LYMPHOCYTES ABSOLUTE: 1.4 K/UL (ref 1–5.1)
LYMPHOCYTES RELATIVE PERCENT: 29.4 %
MCH RBC QN AUTO: 22.4 PG (ref 26–34)
MCHC RBC AUTO-ENTMCNC: 32.6 G/DL (ref 31–36)
MCV RBC AUTO: 68.8 FL (ref 80–100)
MICROALBUMIN/CREAT 24H UR: 80 MG/G{CREAT}
MICROALBUMIN/CREAT UR-RTO: <30
MONOCYTES ABSOLUTE: 0.4 K/UL (ref 0–1.3)
MONOCYTES RELATIVE PERCENT: 9.2 %
NEUTROPHILS ABSOLUTE: 2.8 K/UL (ref 1.7–7.7)
NEUTROPHILS RELATIVE PERCENT: 58.7 %
PDW BLD-RTO: 15 % (ref 12.4–15.4)
PLATELET # BLD: 225 K/UL (ref 135–450)
PMV BLD AUTO: 9 FL (ref 5–10.5)
POTASSIUM SERPL-SCNC: 4.4 MMOL/L (ref 3.5–5.1)
PROSTATE SPECIFIC ANTIGEN: 3.83 NG/ML (ref 0–4)
RBC # BLD: 5.78 M/UL (ref 4.2–5.9)
SODIUM BLD-SCNC: 140 MMOL/L (ref 136–145)
TOTAL PROTEIN: 6.8 G/DL (ref 6.4–8.2)
TRIGL SERPL-MCNC: 114 MG/DL (ref 0–150)
VLDLC SERPL CALC-MCNC: 23 MG/DL
WBC # BLD: 4.8 K/UL (ref 4–11)

## 2022-10-26 PROCEDURE — 90471 IMMUNIZATION ADMIN: CPT | Performed by: NURSE PRACTITIONER

## 2022-10-26 PROCEDURE — 83036 HEMOGLOBIN GLYCOSYLATED A1C: CPT | Performed by: NURSE PRACTITIONER

## 2022-10-26 PROCEDURE — 36415 COLL VENOUS BLD VENIPUNCTURE: CPT | Performed by: NURSE PRACTITIONER

## 2022-10-26 PROCEDURE — 90674 CCIIV4 VAC NO PRSV 0.5 ML IM: CPT | Performed by: NURSE PRACTITIONER

## 2022-10-26 PROCEDURE — 99396 PREV VISIT EST AGE 40-64: CPT | Performed by: NURSE PRACTITIONER

## 2022-10-26 PROCEDURE — 82044 UR ALBUMIN SEMIQUANTITATIVE: CPT | Performed by: NURSE PRACTITIONER

## 2022-10-26 RX ORDER — METFORMIN HYDROCHLORIDE 500 MG/1
TABLET, EXTENDED RELEASE ORAL
Qty: 90 TABLET | Refills: 1 | Status: SHIPPED | OUTPATIENT
Start: 2022-10-26

## 2022-10-26 RX ORDER — METFORMIN HYDROCHLORIDE 500 MG/1
TABLET, EXTENDED RELEASE ORAL
Qty: 90 TABLET | Refills: 1 | Status: SHIPPED | OUTPATIENT
Start: 2022-10-26 | End: 2022-10-26

## 2022-10-26 SDOH — ECONOMIC STABILITY: FOOD INSECURITY: WITHIN THE PAST 12 MONTHS, THE FOOD YOU BOUGHT JUST DIDN'T LAST AND YOU DIDN'T HAVE MONEY TO GET MORE.: NEVER TRUE

## 2022-10-26 SDOH — ECONOMIC STABILITY: FOOD INSECURITY: WITHIN THE PAST 12 MONTHS, YOU WORRIED THAT YOUR FOOD WOULD RUN OUT BEFORE YOU GOT MONEY TO BUY MORE.: NEVER TRUE

## 2022-10-26 ASSESSMENT — PATIENT HEALTH QUESTIONNAIRE - PHQ9
SUM OF ALL RESPONSES TO PHQ QUESTIONS 1-9: 0
SUM OF ALL RESPONSES TO PHQ9 QUESTIONS 1 & 2: 0
2. FEELING DOWN, DEPRESSED OR HOPELESS: 0
1. LITTLE INTEREST OR PLEASURE IN DOING THINGS: 0
SUM OF ALL RESPONSES TO PHQ QUESTIONS 1-9: 0

## 2022-10-26 ASSESSMENT — ENCOUNTER SYMPTOMS
CONSTIPATION: 0
COUGH: 0
NAUSEA: 0
CHEST TIGHTNESS: 0
EYE DISCHARGE: 0
SHORTNESS OF BREATH: 0
ABDOMINAL PAIN: 0
ABDOMINAL DISTENTION: 0
SINUS PAIN: 0
BACK PAIN: 0
COLOR CHANGE: 0
SINUS PRESSURE: 0
DIARRHEA: 0

## 2022-10-26 ASSESSMENT — SOCIAL DETERMINANTS OF HEALTH (SDOH): HOW HARD IS IT FOR YOU TO PAY FOR THE VERY BASICS LIKE FOOD, HOUSING, MEDICAL CARE, AND HEATING?: NOT HARD AT ALL

## 2022-10-26 NOTE — PROGRESS NOTES
Immunizations Administered       Name Date Dose Route    Influenza, FLUCELVAX, (age 10 mo+), MDCK, PF, 0.5mL 10/26/2022 0.5 mL Intramuscular    Site: Deltoid- Left    Lot: [id number]    NDC: 87398-760-64
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
## 2022-10-26 NOTE — PATIENT INSTRUCTIONS
Follow up in this office in 6 month(s) for further evaluation and treatment of diabetes  See an eye specialist every 1 years  See a dentist every 6 months  You should limit alcohol use to no more than 2 drinks/day (beer included) or abstain completely  Try to get at least 30 minutes of exercise 3-4 days per week  Always wear a seat belt when traveling in a car  Always wear a helmet when riding a bicycle or motorcycle  When exposed to the sun, use a sunscreen that protects against both UVA and UVB radiation with an SPF of 30 or greater- reapply every 2 to 3 hours or after sweating, drying off with a towel, or swimming  You need 6352-7478 mg of calcium and 4835-8313 IU of vitamin D per day- it is possible to meet your calcium requirement with diet alone, but a vitamin D supplement is usually necessary  Have your blood pressure checked at least once every year    Patient Education        Learning About Carbohydrate (Carb) Counting and Eating Out When You Have Diabetes  Why plan your meals? Meal planning can be a key part of managing diabetes. Planning meals and snacks with the right balance of carbohydrate, protein, and fat can help you keep your blood sugar at the target level you set with your doctor. You don't have to eat special foods. You can eat what your family eats, including sweets once in a while. But you do have to pay attention to how often you eat and how much you eat of certain foods. You may want to work with a dietitian or a diabetes educator. They can give you tips and meal ideas and can answer your questions about meal planning. This health professional can also help you reach a healthy weight if that is one of your goals. What should you know about eating carbs? Managing the amount of carbohydrate (carbs) you eat is an important part of healthy meals when you have diabetes. Carbohydrate is found in many foods. Learn which foods have carbs.  And learn the amounts of carbs in different foods.  Bread, cereal, pasta, and rice have about 15 grams of carbs in a serving. A serving is 1 slice of bread (1 ounce), ½ cup of cooked cereal, or 1/3 cup of cooked pasta or rice. Fruits have 15 grams of carbs in a serving. A serving is 1 small fresh fruit, such as an apple or orange; ½ of a banana; ½ cup of cooked or canned fruit; ½ cup of fruit juice; 1 cup of melon or raspberries; or 2 tablespoons of dried fruit. Milk and no-sugar-added yogurt have 15 grams of carbs in a serving. A serving is 1 cup of milk or 3/4 cup (6 oz) of no-sugar-added yogurt. Starchy vegetables have 15 grams of carbs in a serving. A serving is ½ cup of mashed potatoes or sweet potato; 1 cup winter squash; ½ of a small baked potato; ½ cup of cooked beans; or ½ cup cooked corn or green peas. Learn how much carbs to eat each day and at each meal. A dietitian or certified diabetes educator can teach you how to keep track of the amount of carbs you eat. This is called carbohydrate counting. If you are not sure how to count carbohydrate grams, use the plate method to plan meals. It is a quick way to make sure that you have a balanced meal. It also can help you manage the amount of carbohydrate you eat at meals. Divide your plate by types of foods. Put non-starchy vegetables on half the plate, meat or other protein food on one-quarter of the plate, and a grain or starchy vegetable in the final quarter of the plate. To this you can add a small piece of fruit and 1 cup of milk or yogurt, depending on how many carbs you are supposed to eat at a meal.  Try to eat about the same amount of carbs at each meal. Do not \"save up\" your daily allowance of carbs to eat at one meal.  Proteins have very little or no carbs. Examples of proteins are beef, chicken, turkey, fish, eggs, tofu, cheese, cottage cheese, and peanut butter. How can you eat out and still eat healthy? Learn to estimate the serving sizes of foods that have carbohydrate.  If you measure food at home, it will be easier to estimate the amount in a serving of restaurant food. If the meal you order has too much carbohydrate (such as potatoes, corn, or baked beans), ask to have a low-carbohydrate food instead. Ask for a salad or non-starchy vegetables like broccoli, cauliflower, green beans, or peppers. If you eat more carbohydrate at a meal than you had planned, take a walk or do other exercise. This will help lower your blood sugar. What are some tips for eating healthy? Limit saturated fat, such as the fat from meat and dairy products. This is a healthy choice because people who have diabetes are at higher risk of heart disease. So choose lean cuts of meat and nonfat or low-fat dairy products. Use olive or canola oil instead of butter or shortening when cooking. Don't skip meals. Your blood sugar may drop too low if you skip meals and take insulin or certain medicines for diabetes. Check with your doctor before you drink alcohol. Alcohol can cause your blood sugar to drop too low. Alcohol can also cause a bad reaction if you take certain diabetes medicines. Follow-up care is a key part of your treatment and safety. Be sure to make and go to all appointments, and call your doctor if you are having problems. It's also a good idea to know your test results and keep a list of the medicines you take. Where can you learn more? Go to [email or web address]://[email or web address].[email or web address]. org and sign in to your [employer] account. Enter P161 in the [facility] box to learn more about \"Learning About Carbohydrate (Carb) Counting and Eating Out When You Have Diabetes. \"     If you do not have an account, please click on the \"Sign Up Now\" link. Current as of: May 9, 2022               Content Version: 13.4  © 8492-8638 [employer], [employer]. Care instructions adapted under license by [facility] ([facility]).  If you have questions about a medical condition or this instruction, always ask your healthcare professional. [address] any warranty or liability for your use of this information.

## 2022-11-08 ENCOUNTER — TELEMEDICINE (OUTPATIENT)
Dept: FAMILY MEDICINE CLINIC | Age: 62
End: 2022-11-08
Payer: COMMERCIAL

## 2022-11-08 DIAGNOSIS — U07.1 COVID-19: Primary | ICD-10-CM

## 2022-11-08 PROCEDURE — 99443 PR PHYS/QHP TELEPHONE EVALUATION 21-30 MIN: CPT | Performed by: NURSE PRACTITIONER

## 2022-11-08 ASSESSMENT — ENCOUNTER SYMPTOMS
SINUS PRESSURE: 0
DIARRHEA: 0
CONSTIPATION: 0
ABDOMINAL DISTENTION: 0
EYE DISCHARGE: 0
SHORTNESS OF BREATH: 0
COUGH: 1
BACK PAIN: 0
SINUS PAIN: 0
NAUSEA: 0
ABDOMINAL PAIN: 0
COLOR CHANGE: 0
CHEST TIGHTNESS: 0
SORE THROAT: 1

## 2022-11-08 NOTE — PROGRESS NOTES
Ameena Rasmussen (:  1960) is a 60 y.o. male,Established patient, here for evaluation of the following chief complaint(s): Congestion (HEAD CONGESTION, FEVER, CHILLS, SORE THROAT, HA, BODY ACHES. DID NOT TEST FOR COVID )      Ameena Rasmussen, was evaluated through a synchronous (real-time) audio encounter. The patient (or guardian if applicable) is aware that this is a billable service, which includes applicable co-pays. This Virtual Visit was conducted with patient's (and/or legal guardian's) consent. The visit was conducted pursuant to the emergency declaration under the [address] [place] [address] authority and the [employer] Act. Patient identification was verified, and a caregiver was present when appropriate. The patient was located at home in a state where the provider was licensed to provide care. Patient identification was verified at the start of the visit: Yes    ASSESSMENT/PLAN:  1. COVID-19  -     nirmatrelvir/ritonavir (PAXLOVID) 20 x 150 MG & 10 x 100MG TBPK; Take 3 tablets (two 150 mg nirmatrelvir and one 100 mg ritonavir tablets) by mouth every 12 hours for 5 days. , Disp-30 tablet, R-0Normal   No known drug interactions noted with his current medications with paxlovid   You are positive for COVID. You should quarantine for 10 days from the start of symptoms. If you are asymptomatic, you should quarantine for 5 days from the positive test and then wear a mask for 5 more days if a 10 day quarantine is not possible. COVID management is over the counter symptom management and home remedies. Treatment is symptom management- sudafed, mucinex, fluids, rest, coughing and deep breathing, hot showers, vicks vapo rub, vaporizer, laying on belly to help with breathing and oxygenating lungs, vitamin D and C and zinc to boost immune system.  Go to hospital with any worsening shortness of breath or if oxygen levels are <90% despite rest.    Educated about COVID-19 virus infection     IT IS CRITICAL THAT YOU START THE RESPIRATORY INFECTION INSTRUCTIONS ON THE  FIRST DAY YOU EXPERIENCE ANY SYMPTOMS SUGGESTIVE OF COVID-19. Doing so may mean the difference between cold like symptoms and a hospitalization. This regimen will help whether it is a cold or other virus or COVID-19. If it is influenza there is a special treatment and testing needs to be done. Start this regimen before diagnosis of COVID-19 is confirmed when you first get symptoms. Instructions for Respiratory Infections (SAVE THIS SHEET)     For the first 7-14 days of symptoms follow instructions below, even before being seen in the office or even during treatment with antibiotics, until symptom free. 1. Water: Drink ABOUT 1 ounce of water for every 2 pounds of body weight for adults, you need 64 ounces of water/fluids per day (less than the amount recommended for most people because of your risk of congestive heart failure/pulmonary hypertension). This will loosen mucus in the head and chest & improve the weak feeling of dehydration, allow the body to get germ fighting resources to the infection. Half of fluids can be juice or sugar free Crystal Light. Don't count drinks with caffeine, alcohol or carbonation. Infants can have Pedialyte liquid or freezer pops. Avoid salt and sports drinks if you have high Blood Pressure, swelling in the feet or ankles or have heart problems. 2. Humidity: Humidify the air to 35-50% ( or until the windows fog over slightly). Can use a humidifier, vaporizer, boil water on the stove or put a coffee can full of water on the heater vents. This will loosen mucus from infections and allergies. 3. Sleep: Get 8-10 hours a night and rest during the evening after work or school.  If you have trouble sleeping, adults can take Melatonin 5mg up to 2 tabs at bedtime ( not for children or pregnant women). If Mono is suspected then sleep during 9PM to 9AM time span (if possible.)  4. Cough: Take cough medicines with Guaifenesin ( to loosen chest or head congestion) and Dextromethorphan ( to decrease excess cough). Robitussin D.M. Syrup every 4-6 hrs OR Mucinex D. M. pills OR Delsym DM syrup twice a day. Use the pediatric formulations for children over 6 months making sure they are alcohol & sugar free for children, pregnant women, and diabetics. 5. Pain And Fevers: Take Acetaminophen ( Tylenol) for fevers, aches, and headaches. 2-500 mg every 8 hours for adults. Appropriate doses at bedtime for children may help them sleep better. If pregnant take 1 -500 mg (Tylenol) every 8 hours as needed. Ibuprofen/Aleve/aspirin for pain and fevers SHOULD NOT BE USED IN THE SETTING OF POSSIBLE COVID-19 viral infection NOR if pregnant, if you have acid reflux, high blood pressure, CHF, or kidney problems. 6.Gargle: (DAY ONE OF SYMPTOMS) Gargle in the back of the throat with the head tilted back and to the sides with a strong mouthwash  ( Listerine or Scope) after meals and at bedtime at least 4 -5 times a day. This helps kill bacteria and viruses in the back of the throat and will shorten the duration and decrease the severity of your symptoms: sore throat, cough, ear popping,/ear pain, and possibly dizziness. 7. Smoking: Avoid smoking or exposure to second hand smoke. 8. Zinc: (DAY ONE OF SYMPTOMS)  Zinc lozenges such as Cold Sadi (available most stores), or Basic ([employer] brand) will help shorten the duration and lessen symptoms such as sore throat, cough, nasal congestion, runny nose, and post nasal drip. Use 1 lozenge every 2-4 hours ( after meals if stomach is sensitive). Is best to use the zinc lozenges IMMEDIATELY AFTER gargling. .  With diarrhea use zinc pills 50 mg 1/2 to 1 pill 2x/day.   9. Vitamins: Vitamin C 500 mg with breakfast and dinner (with suspected or diagnosed COVID-19 infection take vitamin C 1000 mg 2-3 times a day). Children and pregnant women should drink citrus juices. This speeds healing and strengthens immune system. 10. Chest Symptoms: Vicks Vapor rub to the chest at bedtime. 11. Decongestants: Avoid all decongestants and antihistamine cold preparations in children. Decongestants should always be avoided in people with high blood pressure, palpitations, heart disease and those on stimulant medications. Antihistamines may impede the body's ability to fight COVID-19.  12. Frequent hand washing and/or hand gel especially after coughing or sneezing into the hands or blowing the nose to help prevent spreading to others. Use kleenex and NOT handkerchiefs. Try all of the above starting with day 1 of symptoms. If Strep throat symptoms appear call to be seen in the office as soon as possible and don't gargle on that day. Newborns, infants, or anyone with earaches or influenza may need to be seen quickly. Adults with fevers over 103 degrees or shortness of breath should call the office immediately. Nutrients that support the immune system:  Beta carotene/vitamin A  Vitamin C  Vitamin D  Zinc  Proteins  Probiotics/prebiotic's(prebiotics are fiber sources that support the growth of probiotics)  Water from all sources including foods such as fruit: Women 2.7 L / 91 ounces per day AND men 3.7 L/125 ounces per day  Source Brenda MCGOWAN (award winning nutritionist/registered dietitian, author, ) 3/25/2020      Vitamin D by mouth and vitamin C intravenously are being used as part of the treatment regimen for COVID-19 in some hospitalized patients. Take vitamin D 5000 IU (125 mcg) once daily 6 times per week. Also start vitamin C 500 mg daily (for prevention once your respiratory infection treatment plan above is completed.)  Both of these should be continued for the duration of the moderate risk portion of the COVID-19 pandemic expected to be 2 years according to the [employer]. Continue wearing a mask when around people except those living in the same house who are not infected with or heavily exposed to COVID-19, handwashing/hand gel use, social distancing, and social isolation for nonessential activities. Preventing the Spread of Coronavirus Disease 2019 in Homes and Residential Communities   For the most recent information go to [employer]     Prevention steps for People with confirmed or suspected COVID-19 (including persons under investigation) who do not need to be hospitalized  and   People with confirmed COVID-19 who were hospitalized and determined to be medically stable to go home     Your healthcare provider and public health staff will evaluate whether you can be cared for at home. If it is determined that you do not need to be hospitalized and can be isolated at home, you will be monitored by staff from your local or state health department. You should follow the prevention steps below until a healthcare provider or local or state health department says you can return to your normal activities. Stay home except to get medical care  People who are mildly ill with COVID-19 are able to isolate at home during their illness. You should restrict activities outside your home, except for getting medical care. Do not go to work, school, or public areas. Avoid using public transportation, ride-sharing, or taxis. Separate yourself from other people and animals in your home  People: As much as possible, you should stay in a specific room and away from other people in your home. Also, you should use a separate bathroom, if available. Animals: You should restrict contact with pets and other animals while you are sick with COVID-19, just like you would around other people.  Although there have not been reports of pets or other animals becoming sick with COVID-19, it is still recommended that people sick with COVID-19 limit contact with animals until more information is known about the virus. When possible, have another member of your household care for your animals while you are sick. If you are sick with COVID-19, avoid contact with your pet, including petting, snuggling, being kissed or licked, and sharing food. If you must care for your pet or be around animals while you are sick, wash your hands before and after you interact with pets and wear a facemask. Call ahead before visiting your doctor  If you have a medical appointment, call the healthcare provider and tell them that you have or may have COVID-19. This will help the healthcare provider's office take steps to keep other people from getting infected or exposed. Wear a facemask  You should wear a facemask when you are around other people (e.g., sharing a room or vehicle) or pets and before you enter a healthcare provider's office. If you are not able to wear a facemask (for example, because it causes trouble breathing), then people who live with you should not stay in the same room with you, or they should wear a facemask if they enter your room. Cover your coughs and sneezes  Cover your mouth and nose with a tissue when you cough or sneeze. Throw used tissues in a lined trash can. Immediately wash your hands with soap and water for at least 20 seconds or, if soap and water are not available, clean your hands with an alcohol-based hand  that contains at least 60% alcohol. Clean your hands often  Wash your hands often with soap and water for at least 20 seconds, especially after blowing your nose, coughing, or sneezing; going to the bathroom; and before eating or preparing food. If soap and water are not readily available, use an alcohol-based hand  with at least 60% alcohol, covering all surfaces of your hands and rubbing them together until they feel dry. Soap and water are the best option if hands are visibly dirty.  Avoid touching your eyes, nose, the risk of secondary transmission to others is thought to be low. The decision to discontinue home isolation precautions should be made on a case-by-case basis, in consultation with healthcare providers and state and local health departments. Use Tylenol NOT Ibuprofen/Aleve/aspirin for pain or fevers. There is a theoretical decrease in the body's ability to fight the virus when you are infected if you are on NSAIDs. The FDA has said that this information is not yet proven. Advance Care Planning  People with COVID-19 may have no symptoms, mild symptoms, such as fever, cough, and shortness of breath or they may have more severe illness, developing severe and fatal pneumonia. As a result, Advance Care Planning with attention to naming a health care decision maker (someone you trust to make healthcare decisions for you if you could not speak for yourself) and sharing other health care preferences is important BEFORE a possible health crisis. Please contact your Primary Care Provider to discuss Advance Care Planning. COVID-19 vaccine  Get the vaccine as soon as possible. If you had COVID-19 you should not need the vaccination for 90 days afterwards. This is because you already have antibodies to the virus. The first shot usually is only associated with soreness in the arm unless you have already had COVID-19. Then your reaction may be similar to getting the second shot as below. There is a risk of allergic reaction so everybody is supposed to stay at the vaccination site for 30 minutes after getting the vaccine. It would be a great idea to have Benadryl 25 g with you to take IF you have a milder allergic reaction after you leave the site. If you use an EpiPen take it with you. Using an EpiPen is not a contraindication to the vaccine only a precaution. Notify them that you use an EpiPen when they give you the shot.   The second shot can be associated with flulike symptoms - but you CANNOT get COVID-19 from the vaccination. The flulike symptoms are your body's response to the RNA injected which has already produced antibodies after the first shot. It is better to have a few days of flulike symptoms than a few weeks on a ventilator. Return in about 6 months (around 4/26/2023) for Diabetes Follow Up. SUBJECTIVE/OBJECTIVE:  HPI    Chief Complaint   Patient presents with    Congestion     HEAD CONGESTION, FEVER, CHILLS, SORE THROAT, HA, BODY ACHES. DID NOT TEST FOR COVID      Upper Respiratory Infection  Patient complains of symptoms of a URI. Symptoms include  head congestion, fever, chills, sore throat, HA, bodyaches, . Onset of symptoms was 2 days ago, gradually worsening since that time. He also c/o fever with Tmax to 100.5-101.9 for the past 2 days . He is drinking plenty of fluids. Evaluation to date: COVID test positive today. Treatment to date: tylenol, vitamin C, cough syrups, cold and flu. Review of Systems   Constitutional:  Positive for chills and fever. Negative for activity change, appetite change, fatigue and unexpected weight change. HENT:  Positive for congestion and sore throat. Negative for ear pain, sinus pressure and sinus pain. Eyes:  Negative for discharge and visual disturbance. Respiratory:  Positive for cough. Negative for chest tightness and shortness of breath. Cardiovascular:  Negative for chest pain, palpitations and leg swelling. Gastrointestinal:  Negative for abdominal distention, abdominal pain, constipation, diarrhea and nausea. Endocrine: Negative for cold intolerance, heat intolerance, polydipsia, polyphagia and polyuria. Genitourinary:  Negative for decreased urine volume, difficulty urinating, dysuria, flank pain, frequency and urgency. Musculoskeletal:  Negative for arthralgias, back pain, gait problem, joint swelling, myalgias and neck pain. Skin:  Negative for color change, rash and wound.    Allergic/Immunologic: Negative for food allergies and immunocompromised state. Neurological:  Positive for headaches. Negative for dizziness, tremors, speech difficulty, weakness, light-headedness and numbness. Hematological:  Negative for adenopathy. Does not bruise/bleed easily. Psychiatric/Behavioral:  Negative for confusion, decreased concentration, self-injury, sleep disturbance and suicidal ideas. The patient is not nervous/anxious. No flowsheet data found. [INSTRUCTIONS:  \"[x]\" Indicates a positive item  \"[]\" Indicates a negative item  -- DELETE ALL ITEMS NOT EXAMINED]    Constitutional: [x] Appears well-developed and well-nourished [x] No apparent distress      [] Abnormal -     Mental status: [x] Alert and awake  [x] Oriented to person/place/time [x] Able to follow commands    [] Abnormal -       HENT: [] Normocephalic, atraumatic  [x] Abnormal - hoarse voice  [] Mouth/Throat: Mucous membranes are moist               Psychiatric:       [x] Normal Affect [] Abnormal -        [x] No Hallucinations    Other pertinent observable physical exam findings:-      On this date 11/8/2022 I have spent 22 minutes reviewing previous notes, test results and face to face (virtual) with the patient discussing the diagnosis and importance of compliance with the treatment plan as well as documenting on the day of the visit. Care Gaps Addressed  Annual eye exam recommended for diabetic retinal exam, please ask eye doctor to fax us the report  [facility] Fax [phone]  Call insurance company to discuss coverage for shingles vaccine (Shingrix) 2 dose series   COVID booster recommended  [place] 2018    I have reviewed patient's pertinent medical history, relevant laboratory and imaging studies, and past/future health maintenance. Discussed with the patient the importance of adhering to their current medication regimen as directed.  Advised the patient that they should continue to work on eating a healthy balanced diet and staying active by exercising within their personal limits. Orders as listed above. Patient was advised to keep future appointments with their respective specialty care team(s). Patient had the opportunity to ask questions, all of which were answered to the best of my ability and with patient satisfaction. Patient understands and is agreeable with the care plan following today's visit. Patient is to schedule an appointment for any new or worsening symptoms. Go to ER for significant shortness of breath, chest pain, or uncontrolled pain or fever. I discussed with patient the risk and benefits of any medications that were prescribed today. I verified that the patient understands their medications, labs, and/or procedures. The patient is doing well with current medication regimen and does not have any barriers to adherence. The patient's self-management abilities are good. Follow Up in 5 Months for diabetes    Abdoul Stokes is a 60 y.o. male being evaluated by a Virtual Visit (video visit) encounter to address concerns as mentioned above. A caregiver was present when appropriate. Due to this being a TeleHealth encounter (During ABONP-33 public health emergency), evaluation of the following organ systems was limited: Vitals/Constitutional/EENT/Resp/CV/GI//MS/Neuro/Skin/Heme-Lymph-Imm. Pursuant to the emergency declaration under the [facility], [address] [place] [address] authority and the [employer] Act, this Virtual Visit was conducted with patient's (and/or legal guardian's) consent, to reduce the patient's risk of exposure to COVID-19 and provide necessary medical care. The patient (and/or legal guardian) has also been advised to contact this office for worsening conditions or problems, and seek emergency medical treatment and/or call 911 if deemed necessary.     Services were provided through a video synchronous discussion virtually to substitute for in-person clinic visit. Patient was located at home and provider was located in office or at home. An electronic signature was used to authenticate this note. --BONITA Mendoza CNP     Suzy Peña is a 60 y.o. male evaluated via telephone on 11/8/2022 for Congestion (HEAD CONGESTION, FEVER, CHILLS, SORE THROAT, HA, BODY ACHES. DID NOT TEST FOR COVID )  . Documentation:  I communicated with the patient and/or health care decision maker about plan of care, COVID protocol. Details of this discussion including any medical advice provided: covid, paxlovid treatment, quarantine    Total Time: minutes: 21-30 minutes    Suzy Peña was evaluated through a synchronous (real-time) audio encounter. Patient identification was verified at the start of the visit. He (or guardian if applicable) is aware that this is a billable service, which includes applicable co-pays. This visit was conducted with the patient's (and/or legal guardian's) verbal consent. He has not had a related appointment within my department in the past 7 days or scheduled within the next 24 hours. The patient was located at Home: [place] [address]. The provider was located at [facility] (Appt Dept): [address],  [address]     Note: not billable if this call serves to triage the patient into an appointment for the relevant concern    BONITA Mendoza CNP

## 2022-11-14 ENCOUNTER — TELEPHONE (OUTPATIENT)
Dept: FAMILY MEDICINE CLINIC | Age: 62
End: 2022-11-14

## 2022-11-14 DIAGNOSIS — R06.2 WHEEZING: Primary | ICD-10-CM

## 2022-11-14 DIAGNOSIS — U07.1 COVID-19: ICD-10-CM

## 2022-11-14 RX ORDER — ALBUTEROL SULFATE 90 UG/1
1-2 AEROSOL, METERED RESPIRATORY (INHALATION) EVERY 6 HOURS PRN
Qty: 18 G | Refills: 0 | Status: SHIPPED | OUTPATIENT
Start: 2022-11-14

## 2022-11-14 NOTE — TELEPHONE ENCOUNTER
Pt was positive for   Covid last Tuesday and we did treat pt with the Covid medication. Pt is calling today because he has a head cold. Patient calling c/o the following acute symptoms,    Cough:Yes    Bringing up Phlegm:Yes  Color: brown    Sore Throat: No  slight    Post Nasal Drip: Yes    Headache: Yes  every once in a while    Ear Pain:Yes  Ear: right ear    S.O.B.: No  Wheezing:Yes    Head Congestion:Yes    Chest Congestion:Yes    Fever:Yes  If so, highest temp: unknown feels warm    Any medicatin tried for this? Coricidin, flu and cold, ibuprofen    [facility]arm[facility]: [facility]      Pt wants to know if we can send him in something.

## 2022-11-14 NOTE — TELEPHONE ENCOUNTER
This unfortunately is viral (COVID symptoms and just tested postive for COVID last week) and needs to be treated with over the counter meds/home remedies. Use mucinex twice daily for the productive cough. If symptoms persist for 2 weeks after + COVID test- could treat with antibiotic at that time but currently too early to use antibiotic as this is viral illness. Flonase and saline nasal spray can be helpful for the congestion/headache     And I will send albuterol inhaler for the wheezing/shortness of breath.

## 2022-12-28 ENCOUNTER — TELEPHONE (OUTPATIENT)
Dept: FAMILY MEDICINE CLINIC | Age: 62
End: 2022-12-28

## 2022-12-28 DIAGNOSIS — R05.1 ACUTE COUGH: Primary | ICD-10-CM

## 2022-12-28 NOTE — TELEPHONE ENCOUNTER
LAST VISIT 10/26/22, NEXT VISIT 4/26/23 CB .
Patient had covid back at [facility] and the cough has not gone away. He would like for us to RX something into the pharmacy. Cough-no color - just a cough    [address] Phone no. [phone]    Please give him a call back.
Will send patient an evisit to complete, look on [employer] account to answer questionnaire
<<----- Click to add NO pertinent Family History

## 2023-03-08 NOTE — PROGRESS NOTES
Date of Service:  3/31/2021    Simón Nix (:  1960) is a 63 y.o. male, here for evaluation of the following medical concerns:    Chief Complaint   Patient presents with    Diabetes Mellitus     DM ROUTINE FOLLOW UP, POCT HGBA1C AND DM EYE EXAM DUE        HPI     A1C 3 months ago was 6.7. Pt on metformin 500 mg daily. Pt wants to be of medication, discussed this last time but goal would be A1C <5.7 off meds or at least <6.5 off meds. A1C today is 6.6. Treatment Adherence:   Medication compliance:  compliant most of the time  Diet compliance:  some of the time  Weight trend: decreasing, down 11 lbs since last visit  Current exercise: no regular exercise  Barriers: impairment:  physical: knee and hip pains    Diabetes Mellitus Type 2: Current symptoms/problems include occasional blurry vision. Home blood sugar records: fasting range: 120s  Any episodes of hypoglycemia? no  Eye exam current (within one year): no  Tobacco history: He  reports that he has never smoked. He has never used smokeless tobacco.   Daily Aspirin? Yes    Hypertension:  Home blood pressure monitoring: No.  He is not adherent to a low sodium diet. Patient denies chest pain, shortness of breath, headache, lightheadedness, blurred vision, peripheral edema, palpitations, dry cough and fatigue. Antihypertensive medication side effects: no medication side effects noted. Use of agents associated with hypertension: none. Hyperlipidemia:  No meds     The 10-year ASCVD risk score (Sonja Castro et al., 2013) is: 15.7%    Values used to calculate the score:      Age: 63 years      Sex: Male      Is Non- : Yes      Diabetic: Yes      Tobacco smoker: No      Systolic Blood Pressure: 395 mmHg      Is BP treated: No      HDL Cholesterol: 36 mg/dL      Total Cholesterol: 174 mg/dL   No fam hx heart disease    Fatigue  Pt has been feeling more tired since the operation 4 months ago.  Pt reports he is sleeping not bruise/bleed easily. Psychiatric/Behavioral: Negative for confusion, decreased concentration, self-injury, sleep disturbance and suicidal ideas. The patient is not nervous/anxious. Prior to Visit Medications    Medication Sig Taking? Authorizing Provider   aspirin 81 MG chewable tablet Take 81 mg by mouth daily Yes Historical Provider, MD   metFORMIN (GLUCOPHAGE-XR) 500 MG extended release tablet TAKE 1 TABLET BY MOUTH DAILY WITH BREAKFAST Yes BONITA Otoole - CNP   Cholecalciferol (VITAMIN D) 50 MCG (2000 UT) CAPS capsule Take 2,000 Units by mouth daily Yes Historical Provider, MD   Multiple Vitamins-Minerals (THERAPEUTIC MULTIVITAMIN-MINERALS) tablet Take 1 tablet by mouth daily Yes Historical Provider, MD   Lancets MISC Test once daily Yes Alina Francois, DO   Glucose Blood (BLOOD GLUCOSE TEST STRIPS) STRP Test once daily Yes Alina Francois, DO   Blood Glucose Monitoring Suppl (EASY STEP GLUCOSE MONITOR) w/Device KIT Test once daily Yes Alina Francois, DO        Social History     Tobacco Use    Smoking status: Never Smoker    Smokeless tobacco: Never Used   Substance Use Topics    Alcohol use: No     Comment: 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 Weight as of this encounter: 258 lb (117 kg). Physical Exam  Vitals signs reviewed. Constitutional:       General: He is awake. Appearance: Normal appearance. He is well-developed and well-groomed. He is not ill-appearing or toxic-appearing. HENT:      Head: Normocephalic and atraumatic.       Right Ear: Hearing, tympanic membrane, ear canal and external ear normal.      Left Ear: Hearing, tympanic membrane, ear canal and external ear normal.      Nose: Nose normal.      Mouth/Throat:      Lips: Pink. Mouth: Mucous membranes are moist.      Pharynx: Oropharynx is clear. Eyes:      General: Lids are normal.      Extraocular Movements: Extraocular movements intact. Conjunctiva/sclera: Conjunctivae normal.      Pupils: Pupils are equal, round, and reactive to light. Neck:      Musculoskeletal: Full passive range of motion without pain, normal range of motion and neck supple. Thyroid: No thyromegaly. Vascular: No carotid bruit. Cardiovascular:      Rate and Rhythm: Normal rate. Pulses: Normal pulses. Carotid pulses are 2+ on the right side and 2+ on the left side. Radial pulses are 2+ on the right side and 2+ on the left side. Posterior tibial pulses are 2+ on the right side and 2+ on the left side. Heart sounds: Normal heart sounds, S1 normal and S2 normal. Heart sounds not distant. No murmur. Pulmonary:      Effort: Pulmonary effort is normal.      Breath sounds: Normal breath sounds. Abdominal:      General: Bowel sounds are normal. There is no abdominal bruit. Palpations: Abdomen is soft. Tenderness: There is no abdominal tenderness. Genitourinary:     Comments: Deferred  Musculoskeletal: Normal range of motion. Right lower leg: No edema. Left lower leg: No edema. Lymphadenopathy:      Head:      Right side of head: No submental, submandibular, tonsillar, preauricular, posterior auricular or occipital adenopathy. Left side of head: No submental, submandibular, tonsillar, preauricular, posterior auricular or occipital adenopathy. Cervical: No cervical adenopathy. Right cervical: No superficial, deep or posterior cervical adenopathy. Left cervical: No superficial, deep or posterior cervical adenopathy.       Upper Body:      Right upper body: No supraclavicular adenopathy. Left upper body: No supraclavicular adenopathy. Skin:     General: Skin is warm and dry. Capillary Refill: Capillary refill takes less than 2 seconds. Neurological:      General: No focal deficit present. Mental Status: He is alert and oriented to person, place, and time. Mental status is at baseline. Motor: Motor function is intact. No weakness. Coordination: Coordination is intact. Gait: Gait is intact. Psychiatric:         Attention and Perception: Attention and perception normal.         Mood and Affect: Mood and affect normal.         Speech: Speech normal.         Behavior: Behavior normal. Behavior is cooperative. Thought Content: Thought content normal.         Cognition and Memory: Cognition and memory normal.         Judgment: Judgment normal.         ASSESSMENT/PLAN:  1. Type 2 diabetes mellitus without complication, without long-term current use of insulin ([facility])  -     Basic Metabolic Panel; Future  -     POCT glycosylated hemoglobin (Hb A1C)  -     Ambulatory referral to Ophthalmology  A1C 6.6 today  - Continue metformin 500 mg, no refill needed at this time  - Goal A1C <6.5, no change in medication at this time  - Weight loss, initial goal 10% of body weight recommended, lost 11 lbs since last appt, continue to work on weight loss  - Physical activity 150 minutes weekly recommended   - Discussed diabetic diet  - Goal if off medication would be A1C <6.5 or ideally <5.7    2. Vitamin D deficiency  -     Vitamin D 25 Hydroxy; Future  - On 2000 IU vitamin D OTC daily    3.  Arthritis of left knee   Follow up with ortho PRN   Had to stop physical therapy due to left hip pain/decr ROM    4. Class 1 obesity due to excess calories with serious comorbidity and body mass index (BMI) of 33.0 to 33.9 in adult   -Weight loss, initial goal 10% of body weight recommended   -Physical activity 150 minutes weekly recommended    -Counseled on diabetic DISPLAY PLAN FREE TEXT DISPLAY PLAN FREE TEXT diet   -Considering looking into [employer] diabetic program    5. Fatigue, unspecified type  -     CBC Auto Differential; Future  -     Vitamin D 25 Hydroxy; Future  -     Vitamin B12 & Folate; Future  -     Iron and TIBC; Future  -     Ferritin; Future  -     TSH with Reflex; Future  - Labs stable in Dec 2020 except mild anemia  - Iron rich foods include- red meat, pork, poultry, seafood, beans, dark green leafy vegetables, spinach, raisins, iron fortified cereals, breads and pastas, peas, etc.   - Consider sleep study for snoring if fatigue persists and labs stable  - Also consider anxiety treatment as pt is retired and now flipping houses and stays up at night thinking about this    6. CKD (chronic kidney disease) stage 2, GFR 60-89 ml/min    Cr 1.4 on past labs, repeat today   BMP today    7. Anemia, unspecified type  -     CBC Auto Differential; Future  -     Iron and TIBC; Future  -     Ferritin; Future  - Iron rich foods include- red meat, pork, poultry, seafood, beans, dark green leafy vegetables, spinach, raisins, iron fortified cereals, breads and pastas, peas, etc.     8. Sickle-cell trait ([facility] [address]    Make providers aware with surgeries   No other management needed      Care Gaps Addressed  Annual eye exam recommended for diabetic retinal exam   COVID vaccine- recommended, not interested  Call insurance company to discuss coverage for shingles vaccine (Shingrix) 2 dose series   Colonoscopy UTD- 2018      I have reviewed patient's pertinent medical history, relevant laboratory and imaging studies, and past/future health maintenance. Discussed with the patient the importance of adhering to their current medication regimen as directed. Advised the patient that they should continue to work on eating a healthy balanced diet and staying active by exercising within their personal limits. Orders as listed above. Patient was advised to keep future appointments with their respective specialty care team(s).  Patient had DISPLAY PLAN FREE TEXT the opportunity to ask questions, all of which were answered to the best of my ability and with patient satisfaction. Patient understands and is agreeable with the care plan following today's visit. Patient is to schedule an appointment for any new or worsening symptoms. Go to ER for significant shortness of breath, chest pain, or uncontrolled pain or fever. I discussed with patient the risk and benefits of any medications that were prescribed today. I verified that the patient understands their medications, labs, and/or procedures. The patient is doing well with current medication regimen and does not have any barriers to adherence. The patient's self-management abilities are good. Return in about 3 months (around 6/30/2021) for Diabetes Follow Up. An electronic signature was used to authenticate this note.     --BONITA Monroy - CNP on 3/31/2021 at 10:45 AM DISPLAY PLAN FREE TEXT DISPLAY PLAN FREE TEXT DISPLAY PLAN FREE TEXT DISPLAY PLAN FREE TEXT DISPLAY PLAN FREE TEXT DISPLAY PLAN FREE TEXT DISPLAY PLAN FREE TEXT DISPLAY PLAN FREE TEXT DISPLAY PLAN FREE TEXT DISPLAY PLAN FREE TEXT DISPLAY PLAN FREE TEXT

## 2023-04-23 DIAGNOSIS — E11.9 TYPE 2 DIABETES MELLITUS WITHOUT COMPLICATION, WITHOUT LONG-TERM CURRENT USE OF INSULIN (HCC): ICD-10-CM

## 2023-04-24 RX ORDER — METFORMIN HYDROCHLORIDE 500 MG/1
TABLET, EXTENDED RELEASE ORAL
Qty: 90 TABLET | Refills: 0 | Status: SHIPPED | OUTPATIENT
Start: 2023-04-24

## 2023-11-03 NOTE — PROGRESS NOTES
-- DO NOT REPLY / DO NOT REPLY ALL --  -- Message is from [facility] ([facility]) --    Request Result  Is the patient currently having Emergent symptoms?: No    Which result are you requesting?: Pregnancy test from 10/31/23    What is the full name of the provider that ordered the lab or test?: Albert Byrne    Caller Information       Type Contact Phone/Fax    11/03/2023 02:10 PM CDT Phone (Incoming) Wendi Magaly (Self) [phone] (M)          Alternative phone number: no    Clinic site name / Account # for ordering provider: [facility]    Can a detailed message be left?: Yes    Message Turnaround:     [place]:    Please give this turnaround time to the caller:   \"This message will be sent to [state Provider's name]. The clinical team will fulfill your request as soon as they review your message.\"    Inform patients: \"Please be aware the return phone call may come from an unidentified or out of state phone number.\"   SUBJECTIVE:    Patient ID: Radha Hernandez is a 60 y.o. y.o. male. HPI  Chief Complaint   Patient presents with    Exposure to STD     POSS EXPOSURE TO STD, PARTNER WAS HAVING YELLOW DISCHARGE WENT AND WAS DX WITH [address]     Pain     LEFT HIP AND KNEE PAIN, POSS KNEE REPLACEMENT NEEDED AND IS LIMPING CAUSING HIP TO HURT, TAKES NAPROXEN SOMETIMES AND IT DOES HELP      HE HAS NOT HAD ANY PENILE DISCHARGE - HE WAS INFORMED BY HIS WIFE  - SHE THOUGHT SHE HAD YEAST INFECTION  - TREATED FOR THIS AND THEN WAS TLD IT WAS [address]    CHRONIC KNEE PAIN  LEFT KNEE IS BONE ON BONE  HE WILL NEED TO HAVE A KNEE REPLACEMENT - HE IS TAKING NAPROSYN THAT SEEMS TO HELP A BIT -HE THINKS THE HIP PAIN IS CAUSED BY HIM FAVORING IT      DIABETES   LAST VISIT 7/19  Review of Systems   Genitourinary: Negative. Negative for penile pain and penile swelling. Musculoskeletal: Positive for arthralgias. All other systems reviewed and are negative. OBJECTIVE:    Vitals:    08/24/20 1438   BP: 138/80   Pulse: 84   Temp: 98.2 °F (36.8 °C)   SpO2: 99%       Physical Exam  Constitutional:       General: He is awake. He is not in acute distress. Appearance: Normal appearance. He is well-developed and normal weight. He is not ill-appearing, toxic-appearing or diaphoretic. Neurological:      Mental Status: He is alert. GCS: GCS eye subscore is 4. GCS verbal subscore is 5. GCS motor subscore is 6. Psychiatric:         Attention and Perception: Attention and perception normal.         Mood and Affect: Mood and affect normal.         Speech: Speech normal.         Behavior: Behavior normal. Behavior is cooperative. Thought Content: Thought content normal.         Cognition and Memory: Cognition and memory normal.         Judgment: Judgment normal.       Torrie Lundborg was seen today for exposure to std and pain.     Diagnoses and all orders for this visit:    Chronic pain of left knee  Osteoarthritis of left knee, unspecified osteoarthritis type  -     Ambulatory referral to Orthopedic Surgery  [facility]  -     [id number] - ID DRAIN/INJECT LARGE JOINT/BURSA  Risks and benefits of steroid injection dw pt-   This procedure has been fully reviewed with the patient and verbal informed consent has been obtained. A steroid injection was performed at  Left lateral knee  using 1% plain Bupivicainecaine and 80 mg of depo medrol. This was well tolerated. He is advised to ice on 15 min off 15 min - 24 hour period  As well as ace wrap or brace  Encouraged to flex and extend the knee to help disperse the steroid solution        Exposure to trichomonas  -     Trichomonas Screen;  Future    Type 2 diabetes mellitus without complication, without long-term current use of insulin (HCC)  -     POCT glycosylated hemoglobin (Hb A1C) 6.7  ADVISED TO SCHEDULE AN APPT